# Patient Record
Sex: FEMALE | Race: BLACK OR AFRICAN AMERICAN | Employment: FULL TIME | URBAN - METROPOLITAN AREA
[De-identification: names, ages, dates, MRNs, and addresses within clinical notes are randomized per-mention and may not be internally consistent; named-entity substitution may affect disease eponyms.]

---

## 2017-04-14 ENCOUNTER — HOSPITAL ENCOUNTER (OUTPATIENT)
Dept: MAMMOGRAPHY | Age: 57
Discharge: HOME | End: 2017-04-14
Payer: COMMERCIAL

## 2017-04-14 DIAGNOSIS — Z12.31 VISIT FOR SCREENING MAMMOGRAM: ICD-10-CM

## 2017-04-14 PROCEDURE — 77063 BREAST TOMOSYNTHESIS BI: CPT

## 2017-04-21 ENCOUNTER — HOSPITAL ENCOUNTER (OUTPATIENT)
Dept: MAMMOGRAPHY | Age: 57
Discharge: HOME | End: 2017-04-21
Payer: COMMERCIAL

## 2017-04-21 ENCOUNTER — HOSPITAL ENCOUNTER (OUTPATIENT)
Dept: ULTRASOUND IMAGING | Age: 57
Discharge: HOME | End: 2017-04-21
Payer: COMMERCIAL

## 2017-04-21 DIAGNOSIS — R92.8 ABNORMAL MAMMOGRAM: ICD-10-CM

## 2017-04-21 DIAGNOSIS — N63.0 LUMP OR MASS IN BREAST: ICD-10-CM

## 2017-04-21 PROCEDURE — 77065 DX MAMMO INCL CAD UNI: CPT

## 2017-04-21 PROCEDURE — 76642 ULTRASOUND BREAST LIMITED: CPT

## 2017-06-14 ENCOUNTER — HOSPITAL ENCOUNTER (EMERGENCY)
Age: 57
Discharge: HOME | End: 2017-06-14
Attending: EMERGENCY MEDICINE
Payer: COMMERCIAL

## 2017-06-14 VITALS
HEART RATE: 77 BPM | BODY MASS INDEX: 37.77 KG/M2 | TEMPERATURE: 97.6 F | OXYGEN SATURATION: 99 % | DIASTOLIC BLOOD PRESSURE: 101 MMHG | SYSTOLIC BLOOD PRESSURE: 155 MMHG | RESPIRATION RATE: 16 BRPM | WEIGHT: 235 LBS | HEIGHT: 66 IN

## 2017-06-14 DIAGNOSIS — M43.6 TORTICOLLIS: Primary | ICD-10-CM

## 2017-06-14 PROCEDURE — 96372 THER/PROPH/DIAG INJ SC/IM: CPT

## 2017-06-14 PROCEDURE — 74011250636 HC RX REV CODE- 250/636: Performed by: EMERGENCY MEDICINE

## 2017-06-14 PROCEDURE — 99283 EMERGENCY DEPT VISIT LOW MDM: CPT

## 2017-06-14 PROCEDURE — 74011250637 HC RX REV CODE- 250/637: Performed by: EMERGENCY MEDICINE

## 2017-06-14 RX ORDER — METHOCARBAMOL 750 MG/1
750 TABLET, FILM COATED ORAL 4 TIMES DAILY
Qty: 20 TAB | Refills: 0 | Status: SHIPPED | OUTPATIENT
Start: 2017-06-14

## 2017-06-14 RX ORDER — MORPHINE SULFATE 4 MG/ML
4 INJECTION, SOLUTION INTRAMUSCULAR; INTRAVENOUS
Status: COMPLETED | OUTPATIENT
Start: 2017-06-14 | End: 2017-06-14

## 2017-06-14 RX ORDER — METHOCARBAMOL 500 MG/1
1000 TABLET, FILM COATED ORAL ONCE
Status: COMPLETED | OUTPATIENT
Start: 2017-06-14 | End: 2017-06-14

## 2017-06-14 RX ORDER — GABAPENTIN 300 MG/1
300 CAPSULE ORAL 3 TIMES DAILY
COMMUNITY

## 2017-06-14 RX ORDER — METHOCARBAMOL 500 MG/1
1000 TABLET, FILM COATED ORAL 4 TIMES DAILY
Status: DISCONTINUED | OUTPATIENT
Start: 2017-06-14 | End: 2017-06-14

## 2017-06-14 RX ADMIN — Medication 4 MG: at 23:11

## 2017-06-14 RX ADMIN — METHOCARBAMOL 1000 MG: 500 TABLET ORAL at 23:09

## 2017-06-14 NOTE — LETTER
NOTIFICATION RETURN TO WORK / SCHOOL 
 
6/14/2017 11:35 PM 
 
Ms. Leora Shaver [address] To Whom It May Concern: 
 
Leora Shaver is currently under the care of [id number] [place] [address] EMERGENCY DEPT. She will return to work/school on: 6/16/17 If there are questions or concerns please have the patient contact our office. Sincerely, [address] Tavarez, DO

## 2017-06-15 NOTE — ED NOTES
Both written and verbal discharge instructions given to pt with verbalized understanding of home care and follow up. Prescription given. Pt has ride home with her spouse.

## 2017-06-15 NOTE — ED PROVIDER NOTES
HPI Comments: 10:39 PM Donavon Alvarado is a 65 y.o. female with a hx of HTN who is presenting to the ED c/o muscle spasms in the right side of the neck that started four days ago. She was taking 800mg of Ibuprofen and BC powder and applying heat for the first three days with some relief of the pain, but the pain worsened this morning, so she took her Flexeril and Tramadol which caused her to fall asleep, but the pain would wake her up. The pt denies trauma chest pain, cough, dysuria, hematuria, tobacco use, illicit drug use, EtOH use, and any further sx. The history is provided by the patient. Past Medical History:   Diagnosis Date    Arthritis     Depression     Hypertension     Sciatica        Past Surgical History:   Procedure Laterality Date    HX GYN      hysterectomy    HX ORTHOPAEDIC      left foot x's 3         Family History:   Problem Relation Age of Onset    Diabetes Mother     Heart Disease Mother     Heart Disease Sister     Heart Disease Sister        Social History     Social History    Marital status:      Spouse name: N/A    Number of children: N/A    Years of education: N/A     Occupational History    Not on file. Social History Main Topics    Smoking status: Current Every Day Smoker     Packs/day: 1.50     Years: 30.00     Types: Cigarettes    Smokeless tobacco: Never Used    Alcohol use No    Drug use: No    Sexual activity: Yes     Partners: Female     Other Topics Concern    Not on file     Social History Narrative         ALLERGIES: Demerol [meperidine]    Review of Systems   Constitutional: Negative for chills and fever. HENT: Negative for congestion and sore throat. Respiratory: Negative for cough and shortness of breath. Cardiovascular: Negative for chest pain and leg swelling. Gastrointestinal: Negative for abdominal pain and nausea. Genitourinary: Negative for dysuria and hematuria.    Musculoskeletal: Positive for muscle spasms and neck pain. Negative for back pain. Skin: Negative for rash and wound. Neurological: Negative for syncope, light-headedness and headaches. Psychiatric/Behavioral: Negative for behavioral problems. The patient is not nervous/anxious. All other systems reviewed and are negative. Vitals:    06/14/17 2233   BP: (!) 155/101   Pulse: 77   Resp: 16   Temp: 97.6 °F (36.4 °C)   SpO2: 99%   Weight: 106.6 kg (235 lb)   Height: 5' 6\" (1.676 m)            Physical Exam   Constitutional: She appears well-developed and well-nourished. Non-toxic appearance. She does not have a sickly appearance. She does not appear ill. No distress. HENT:   Head: Normocephalic and atraumatic. Mouth/Throat: Oropharynx is clear and moist. No oropharyngeal exudate. Eyes: Conjunctivae and EOM are normal. Pupils are equal, round, and reactive to light. No scleral icterus. Neck: Trachea normal. Neck supple. Normal carotid pulses, no hepatojugular reflux and no JVD present. Muscular tenderness present. No spinous process tenderness present. Carotid bruit is not present. No rigidity. Decreased range of motion present. No tracheal deviation, no edema and no erythema present. Kernig's sign noted. No Brudzinski's sign noted. No thyromegaly present. Cardiovascular: Normal rate, regular rhythm, S1 normal, S2 normal, normal heart sounds, intact distal pulses and normal pulses. Exam reveals no gallop, no S3 and no S4. No murmur heard. Pulses:       Radial pulses are 2+ on the right side, and 2+ on the left side. Dorsalis pedis pulses are 2+ on the right side, and 2+ on the left side. Pulmonary/Chest: Effort normal and breath sounds normal. No respiratory distress. She has no decreased breath sounds. She has no wheezes. She has no rhonchi. She has no rales. Abdominal: Soft. Normal appearance and bowel sounds are normal. She exhibits no distension and no mass. There is no hepatosplenomegaly. There is no tenderness.  There is no rigidity, no rebound, no guarding, no CVA tenderness, no tenderness at McBurney's point and negative Henry's sign. Musculoskeletal:   Strength 5/5 throughout with exception of right neck/shoulder secondary to pain    Lymphadenopathy:        Head (right side): No submental, no submandibular, no preauricular and no occipital adenopathy present. Head (left side): No submental, no submandibular, no preauricular and no occipital adenopathy present. She has no cervical adenopathy. Right: No supraclavicular adenopathy present. Left: No supraclavicular adenopathy present. Neurological: She is alert. She has normal strength and normal reflexes. She is not disoriented. No cranial nerve deficit or sensory deficit. Coordination and gait normal. GCS eye subscore is 4. GCS verbal subscore is 5. GCS motor subscore is 6. Grossly intact    Skin: Skin is warm, dry and intact. No rash noted. She is not diaphoretic. Psychiatric: She has a normal mood and affect. Her speech is normal and behavior is normal. Judgment and thought content normal. Cognition and memory are normal.   Nursing note and vitals reviewed. MDM  Number of Diagnoses or Management Options  Torticollis:   Diagnosis management comments: Torticollis     ED Course       Procedures      Vitals:  Patient Vitals for the past 12 hrs:   Temp Pulse Resp BP SpO2   06/14/17 2233 97.6 °F (36.4 °C) 77 16 (!) 155/101 99 %     Pulse ox reviewed and WNL    Progress notes, Consult notes or additional Procedure notes:    I have reassessed the patient. Patient is feeling much better. Patient will be prescribed Robaxin. Patient was discharged in stable condition. Patient is to return to emergency department if any new or worsening condition. 11:32 PM      Disposition:  Diagnosis:   1.  Torticollis        Disposition: Discharge    SCRIBE ATTESTATION STATEMENT  Documented by: Anya Crow scribing for, and in the presence of,Sourav STEPHENS DO Adonay   10:44 PM     Signed by: Michael Carroll, 06/14/17 10:44 PM    PROVIDER ATTESTATION STATEMENT  I personally performed the services described in the documentation, reviewed the documentation, as recorded by the scribe in my presence, and it accurately and completely records my words and actions.   [address] Tavarez DO

## 2017-06-15 NOTE — DISCHARGE INSTRUCTIONS
Torticollis: Care Instructions  Your Care Instructions  Torticollis is a severe tightness of the muscles on one side of the neck. The tight muscles can make the head turn to one side, lean to one side, or be pulled forward or backward. It is also called wryneck. Your doctor asked questions about your health and examined you. You may also have had X-rays or other tests. If your doctor thinks another medical problem is causing your tight neck muscles, you may need more tests. Torticollis usually gets better with home care. Your doctor may have you take medicine to relieve pain or relax your muscles. He or she may suggest exercise and physical therapy to help increase flexibility and relieve stress. Your doctor may also have you wear a special collar, called a cervical collar, for a day or two. The collar may help make your neck more comfortable. Follow-up care is a key part of your treatment and safety. Be sure to make and go to all appointments, and call your doctor if you are having problems. It's also a good idea to know your test results and keep a list of the medicines you take. How can you care for yourself at home? · Be safe with medicines. Read and follow all instructions on the label. ¨ If the doctor gave you a prescription medicine for pain, take it as prescribed. ¨ If you are not taking a prescription pain medicine, ask your doctor if you can take an over-the-counter medicine. · Try using a heating pad on a low or medium setting for 15 to 20 minutes every 2 or 3 hours. Try a warm shower in place of one session with the heating pad. · Try using an ice pack for 10 to 15 minutes every 2 to 3 hours. Put a thin cloth between the ice and your skin. · If your doctor recommends a cervical collar, wear it exactly as directed. When should you call for help? Call your doctor now or seek immediate medical care if:  · You have new or worse numbness in your arms, buttocks, or legs.   · You have new or worse weakness in your arms or legs. · Your neck pain gets worse. · You lose bladder or bowel control. Watch closely for changes in your health, and be sure to contact your doctor if:  · You do not get better as expected. Where can you learn more? Go to http://arun-blaze.info/. Enter N373 in the search box to learn more about \"Torticollis: Care Instructions. \"  Current as of: May 23, 2016  Content Version: 11.2  © 4164-8400 [employer]. Care instructions adapted under license by [employer] (which disclaims liability or warranty for this information). If you have questions about a medical condition or this instruction, always ask your healthcare professional. [address] any warranty or liability for your use of this information.

## 2017-06-15 NOTE — ED TRIAGE NOTES
Patient states prior hx of muscle spasms that resolve in a \"day or two\". She states onset of present upper back muscle spasms on Sunday. State no resolution to pain despite NSAID use, tramadol and heat application. C/o spasms across shoulders and down upper back.

## 2017-09-08 ENCOUNTER — OFFICE VISIT (OUTPATIENT)
Dept: SURGERY | Age: 57
End: 2017-09-08

## 2017-09-08 ENCOUNTER — HOSPITAL ENCOUNTER (OUTPATIENT)
Dept: ULTRASOUND IMAGING | Age: 57
Discharge: HOME | End: 2017-09-08
Attending: PHYSICIAN ASSISTANT
Payer: COMMERCIAL

## 2017-09-08 ENCOUNTER — HOSPITAL ENCOUNTER (OUTPATIENT)
Dept: MAMMOGRAPHY | Age: 57
Discharge: HOME | End: 2017-09-08
Attending: PHYSICIAN ASSISTANT
Payer: COMMERCIAL

## 2017-09-08 VITALS — RESPIRATION RATE: 16 BRPM | DIASTOLIC BLOOD PRESSURE: 89 MMHG | SYSTOLIC BLOOD PRESSURE: 139 MMHG

## 2017-09-08 DIAGNOSIS — R92.8 ABNORMAL MAMMOGRAM, UNSPECIFIED: ICD-10-CM

## 2017-09-08 DIAGNOSIS — R22.31 AXILLARY MASS, RIGHT: Primary | ICD-10-CM

## 2017-09-08 PROBLEM — R22.30 AXILLARY MASS: Status: ACTIVE | Noted: 2017-09-08

## 2017-09-08 PROCEDURE — 76642 ULTRASOUND BREAST LIMITED: CPT

## 2017-09-08 PROCEDURE — 77065 DX MAMMO INCL CAD UNI: CPT

## 2017-09-08 NOTE — PROGRESS NOTES
Progress Note    Patient: Luke Guardado  MRN: [id number]  SSN: [id number]   YOB: 1960  Age: 65 y.o. Sex: female     Chief Complaint   Patient presents with    Mass     right axilla       HPI    Ms. Corbin Morales is a 52-year-old woman with a 35 year history of what appears to be an accessory breast tissue in her right axilla. She is also had hidradenitis in that axilla as well. She is becoming more symptomatic with this extra breast tissue which is approximately 15 x 10 cm in size. She would like it removed. I will assess her axilla with CT scan to ensure no lymphadenopathy and plan an excision of her tissue. We discussed how to do this. As well as the risks and benefits of the procedure. Past Medical History:   Diagnosis Date    Arthritis     Depression     Hypertension     Sciatica      Past Surgical History:   Procedure Laterality Date    HX GYN      hysterectomy    HX ORTHOPAEDIC      left foot x's 3     Allergies   Allergen Reactions    Demerol [Meperidine] Anxiety     Current Outpatient Prescriptions   Medication Sig Dispense Refill    gabapentin (NEURONTIN) 300 mg capsule Take 300 mg by mouth three (3) times daily.  losartan 50 mg tab 100 mg, hydroCHLOROthiazide 12.5 mg cap 12.5 mg Take  by mouth daily. States using dosage 160/25mg      IBUPROFEN (MOTRIN PO) Take  by mouth.  methocarbamol (ROBAXIN-750) 750 mg tablet Take 1 Tab by mouth four (4) times daily. 20 Tab 0    cyclobenzaprine (FLEXERIL) 10 mg tablet Take  by mouth three (3) times daily as needed for Muscle Spasm(s). Social History     Social History    Marital status:      Spouse name: N/A    Number of children: N/A    Years of education: N/A     Occupational History    Not on file.      Social History Main Topics    Smoking status: Current Every Day Smoker     Packs/day: 1.50     Years: 30.00    Smokeless tobacco: Never Used      Comment: vapor    Alcohol use No    Drug use: No    Sexual activity: Yes     Partners: Female     Other Topics Concern    Not on file     Social History Narrative     Family History   Problem Relation Age of Onset    Diabetes Mother     Heart Disease Mother     Heart Disease Sister     Heart Disease Sister          Review of systems:  Patient denies any reflux, emesis, abdominal pain, change in bowel habits, hematochezia, melena, fever, weight loss, fatigue chills, dermatitis, abnormal moles, change in vision, vertigo, epistaxis, dysphagia, hoarseness, chest pain, palpitations, hypertension, edema, cough, shortness of breath, wheezing, hemoptysis, snoring, hematuria, diabetes, thyroid disease, anemia, bruising, history of blood transfusion, dizziness, headache, or fainting. Physical Examination    Well developed well nourished female in no apparent distress  Visit Vitals    /89    Resp 16    LMP 08/01/2007      Head: normocephalic, atraumatic  Mouth: Clear, no overt lesions, oral mucosa pink and moist  Neck: supple, no masses, no adenopathy or carotid bruits, trachea midline  Resp: clear to auscultation bilaterally, no wheeze, rhonchi or rales, excursions normal and symmetrical  Cardio: Regular rate and rhythm, no murmurs, clicks, gallops or rubs, no edema or varicosities  Abdomen: soft, nontender, nondistended, normoactive bowel sounds, no hernias, no hepatosplenomegaly,   Back: Deferred  Extremeties: warm, well-perfused, no tenderness or swelling, normal gait/station large excess tissue right axilla likely auxiliary breast tissue neuro: sensation and strength grossly intact and symmetrical  Psych: alert and oriented to person, place and time  Breast exam deferred    IMPRESSION  Right axillary axillary breast tissue    PLAN  No orders of the defined types were placed in this encounter.     Assess axilla and excise tissue  Carmen Maria MD

## 2017-09-08 NOTE — MR AVS SNAPSHOT
Visit Information Date & Time Provider Department Dept. Phone Encounter #  
 9/8/2017  9:00 AM Ronal Tapia MD [facility] [phone] Upcoming Health Maintenance Date Due Hepatitis C Screening 1960 Pneumococcal 19-64 Medium Risk (1 of 1 - PPSV23) 3/22/1980 DTaP/Tdap/Td series (1 - Tdap) 3/22/1982 PAP AKA CERVICAL CYTOLOGY 3/22/1982 FOBT Q 1 YEAR AGE 50-75 3/22/2011 INFLUENZA AGE 9 TO ADULT 8/1/2017 BREAST CANCER SCRN MAMMOGRAM 4/21/2019 Allergies as of 9/8/2017  Review Complete On: 9/8/2017 By: Ronal Tapia MD  
  
 Severity Noted Reaction Type Reactions Demerol [Meperidine] High 09/11/2013    Anxiety Current Immunizations  Never Reviewed No immunizations on file. Not reviewed this visit Vitals BP Resp LMP OB Status Smoking Status 139/89 16 08/01/2007 Hysterectomy Current Every Day Smoker Vitals History Preferred Pharmacy Pharmacy Name Phone [facility] [address] [phone] Your Updated Medication List  
  
   
This list is accurate as of: 9/8/17  9:21 AM.  Always use your most recent med list.  
  
  
  
  
 FLEXERIL 10 mg tablet Generic drug:  cyclobenzaprine Take  by mouth three (3) times daily as needed for Muscle Spasm(s). gabapentin 300 mg capsule Commonly known as:  NEURONTIN Take 300 mg by mouth three (3) times daily. losartan 50 mg tab 100 mg, hydroCHLOROthiazide 12.5 mg cap 12.5 mg Take  by mouth daily. States using dosage 160/25mg  
  
 methocarbamol 750 mg tablet Commonly known as:  [id number]-049 Take 1 Tab by mouth four (4) times daily. MOTRIN PO Take  by mouth. To-Do List   
 09/08/2017 1:15 PM  
  Appointment with [facility] RM 2 at [address] [place] [address] ([phone]) OUTSIDE FILMS  - Any outside films related to the study being scheduled should be brought with you on the day of the exam.  If this cannot be done there may be a delay in the reading of the study. MEDICATIONS  - Patient must bring a complete list of all medications currently taking to include prescriptions, over-the-counter meds, herbals, vitamins & any dietary supplements  GENERAL INSTRUCTIONS  - On the day of your exam do not use any bath powder, deodorant or lotions on the armpit area. 09/08/2017 1:45 PM  
  Appointment with RENE VERDUGO  RM 1 at [address] [place] [address] ([phone]) OUTSIDE FILMS  - Any outside films related to the study being scheduled should be brought with you on the day of the exam.  If this cannot be done there may be a delay in the reading of the study. MEDICATIONS  - Patient must bring a complete list of all medications currently taking to include prescriptions, over-the-counter meds, herbals, vitamins & any dietary supplements Introducing [facility]! Makenna Gonzalez introduces [employer] patient portal. Now you can access parts of your medical record, email your doctor's office, and request medication refills online. 1. In your internet browser, go to [email or web address] 2. Click on the First Time User? Click Here link in the Sign In box. You will see the New Member Sign Up page. 3. Enter your [employer] Access Code exactly as it appears below. You will not need to use this code after youve completed the sign-up process. If you do not sign up before the expiration date, you must request a new code. · [employer] Access Code: [email or web address] Expires: 9/30/2017  7:54 PM 
 
4. Enter the last four digits of your Social Security Number (xxxx) and Date of Birth (mm/dd/yyyy) as indicated and click Submit. You will be taken to the next sign-up page. 5. Create a Tagor[employer]et ID.  This will be your [employer] login ID and cannot be changed, so think of one that is secure and easy to remember. 6. Create a [employer] password. You can change your password at any time. 7. Enter your Password Reset Question and Answer. This can be used at a later time if you forget your password. 8. Enter your e-mail address. You will receive e-mail notification when new information is available in [address] 9. Click Sign Up. You can now view and download portions of your medical record. 10. Click the Download Summary menu link to download a portable copy of your medical information. If you have questions, please visit the Frequently Asked Questions section of the [employer] website. Remember, [employer] is NOT to be used for urgent needs. For medical emergencies, dial 911. Now available from your iPhone and Android! Please provide this summary of care documentation to your next provider. Your primary care clinician is listed as Maria Eugenia Hernandez. If you have any questions after today's visit, please call [phone].

## 2018-01-24 ENCOUNTER — HOSPITAL ENCOUNTER (OUTPATIENT)
Dept: GENERAL RADIOLOGY | Age: 58
Discharge: HOME | End: 2018-01-24
Payer: COMMERCIAL

## 2018-01-24 DIAGNOSIS — M79.631 RIGHT FOREARM PAIN: ICD-10-CM

## 2018-01-24 DIAGNOSIS — M79.644 THUMB PAIN, RIGHT: ICD-10-CM

## 2018-01-24 PROCEDURE — 73130 X-RAY EXAM OF HAND: CPT

## 2018-01-24 PROCEDURE — 73090 X-RAY EXAM OF FOREARM: CPT

## 2018-01-30 ENCOUNTER — OFFICE VISIT (OUTPATIENT)
Dept: ORTHOPEDIC SURGERY | Age: 58
End: 2018-01-30

## 2018-01-30 VITALS
BODY MASS INDEX: 37.77 KG/M2 | SYSTOLIC BLOOD PRESSURE: 147 MMHG | TEMPERATURE: 98.8 F | HEIGHT: 66 IN | RESPIRATION RATE: 16 BRPM | WEIGHT: 235 LBS | HEART RATE: 99 BPM | OXYGEN SATURATION: 100 % | DIASTOLIC BLOOD PRESSURE: 86 MMHG

## 2018-01-30 DIAGNOSIS — M65.4 DE QUERVAIN'S TENOSYNOVITIS, RIGHT: Primary | ICD-10-CM

## 2018-01-30 RX ORDER — GLIPIZIDE 5 MG/1
TABLET ORAL 2 TIMES DAILY
COMMUNITY

## 2018-01-30 RX ORDER — METFORMIN HYDROCHLORIDE 1000 MG/1
1000 TABLET ORAL 2 TIMES DAILY WITH MEALS
COMMUNITY

## 2018-01-30 RX ORDER — TRIAMCINOLONE ACETONIDE 40 MG/ML
20 INJECTION, SUSPENSION INTRA-ARTICULAR; INTRAMUSCULAR ONCE
Qty: 1 ML | Refills: 0
Start: 2018-01-30 | End: 2018-01-30

## 2018-01-30 NOTE — PROGRESS NOTES
Eliot Crowe  1960   Chief Complaint   Patient presents with    Arm Pain     Right    Finger Pain     Right thumb        HISTORY OF PRESENT ILLNESS  Eliot Crowe is a 65 y.o. female who presents today for evaluation of right arm and right thumb pain. Patient was referred by Iza Taylor. she rates her pain 4/10 today. Pain has been present for a few months. Patient describes the pain as aching, stabbing and throbbing that is Intermittent in nature. Symptoms are worse with typing, gripping, certain movements of the hand/wrist, Activity, Work and is better with  Rest. Associated symptoms include soreness, stiffness. Since problem started, it: has worsened. Pain does wake patient up at night. Has taken no recent medications for the problem. Has tried following treatments: Injections:NO; Brace:NO; Therapy:NO; Cane/Crutch:NO       Allergies   Allergen Reactions    Demerol [Meperidine] Anxiety        Past Medical History:   Diagnosis Date    Arthritis     Depression     Diabetes ([facility] [address]     Hypertension     Sciatica       Social History     Social History    Marital status:      Spouse name: N/A    Number of children: N/A    Years of education: N/A     Occupational History    Not on file.      Social History Main Topics    Smoking status: Current Every Day Smoker     Years: 30.00    Smokeless tobacco: Never Used      Comment: vapor    Alcohol use No    Drug use: No    Sexual activity: Yes     Partners: Female     Other Topics Concern    Not on file     Social History Narrative      Past Surgical History:   Procedure Laterality Date    FOOT/TOES SURGERY PROC UNLISTED      HX GYN      hysterectomy    HX HYSTERECTOMY      HX ORTHOPAEDIC      left foot x's 3      Family History   Problem Relation Age of Onset    Diabetes Mother     Heart Disease Mother     Heart Disease Sister     Heart Disease Sister       Current Outpatient Prescriptions   Medication Sig    metFORMIN (GLUCOPHAGE) 1,000 mg tablet Take 1,000 mg by mouth two (2) times daily (with meals).  glipiZIDE (GLUCOTROL) 5 mg tablet Take  by mouth two (2) times a day.  triamcinolone acetonide (KENALOG) 40 mg/mL injection 0.5 mL by IntraMUSCular route once for 1 dose.  gabapentin (NEURONTIN) 300 mg capsule Take 300 mg by mouth three (3) times daily.  losartan 50 mg tab 100 mg, hydroCHLOROthiazide 12.5 mg cap 12.5 mg Take  by mouth daily. States using dosage 160/25mg    IBUPROFEN (MOTRIN PO) Take  by mouth.  methocarbamol (ROBAXIN-750) 750 mg tablet Take 1 Tab by mouth four (4) times daily.  cyclobenzaprine (FLEXERIL) 10 mg tablet Take  by mouth three (3) times daily as needed for Muscle Spasm(s). No current facility-administered medications for this visit. REVIEW OF SYSTEM   Patient denies: Weight loss, Fever/Chills, HA, Visual changes, Fatigue, Chest pain, SOB, Abdominal pain, N/V/D/C, Blood in stool or urine, Edema. Pertinent positive as above in HPI. All others were negative    PHYSICAL EXAM:   Visit Vitals    /86    Pulse 99    Temp 98.8 °F (37.1 °C) (Oral)    Resp 16    Ht 5' 6\" (1.676 m)    Wt 235 lb (106.6 kg)    LMP 08/01/2007    SpO2 100%    BMI 37.93 kg/m2     The patient is a well-developed, well-nourished female   in no acute distress. The patient is alert and oriented times three. The patient is alert and oriented times three. Mood and affect are normal.  LYMPHATIC: lymph nodes are not enlarged and are within normal limits  SKIN: normal in color and non tender to palpation. There are no bruises or abrasions noted. NEUROLOGICAL: Motor sensory exam is within normal limits. Reflexes are equal bilaterally.  There is normal sensation to pinprick and light touch  MUSCULOSKELETAL:  Examination Right Hand   Skin Intact   Deformity -   Swelling -   Tenderness -   Tenderness A1 Pulley -   Finger flexion Full   Finger extension Full   Thenar Eminence Atrophy -   Sensation Normal   Capillary refill -   Heberden's nodes -   Dupuytren's -     Examination Right Wrist   Skin Intact   Tenderness First dorsal compartment   Flexion 60   Extension 60   Deformity -   Effusion -   Tinnel's sign -   Phalen's test -   Finklestein maneuver +   Pain with thumb abduction -       PROCEDURE: After sterile prep, 0.5 cc of Kenalog were injected into the right 1st dorsal compartment. [facility]  OFFICE PROCEDURE PROGRESS NOTE        Chart reviewed for the following:  Luisana Hooper MD, have reviewed the History, Physical and updated the Allergic reactions for [address] performed immediately prior to start of procedure:  Luisana Hooper MD, have performed the following reviews on [address] prior to the start of the procedure:            * Patient was identified by name and date of birth   * Agreement on procedure being performed was verified  * Risks and Benefits explained to the patient  * Procedure site verified and marked as necessary  * Patient was positioned for comfort  * Consent was signed and verified     Time: 2:12 PM    Date of procedure: 1/30/2018    Procedure performed by:  Noah Sampson MD    Provider assisted by: (see medication administration)    How tolerated by patient: tolerated the procedure well with no complications    Comments: none      IMAGING: XR of the right forearm dated 1/24/18 was reviewed and read:   IMPRESSION:  No acute osseous abnormality. Minimal degenerative change. XR of the right hand dated 1/24/18 was reviewed and read: Impression:  No acute osseous abnormality. Mild degenerative change at the first MCP joint. IMPRESSION:      ICD-10-CM ICD-9-CM    1. De Quervain's tenosynovitis, right M65.4 727.04 TRIAMCINOLONE ACETONIDE INJ      triamcinolone acetonide (KENALOG) 40 mg/mL injection      NC DRAIN/INJECT SMALL JOINT/BURSA        PLAN:  1.  Patient's right hand/wrist pain is coming from Yoseph Jenkins. She feels her pain is bad enough for an injection today. Risk factors include: dm, htn  2. Yes cortisone injection indicated today R 1ST DORSAL COMPARTMENT  3. No Physical/Occupational Therapy indicated today  4. No diagnostic test indicated today  5. No durable medical equipment indicated today  6. No referral indicated today   7. No medications indicated today  8. No Narcotic indicated today     RTC 4 weeks  Follow-up Disposition: Not on File  Office note will be sent to referring provider. Scribed by Lamar Sanford ([address] [place] [address] [address]) as dictated by Kristin Aviles MD    I, Dr. Kristin Aviles, confirm that all documentation is accurate.     Kristin Aviles M.D.   [facility]

## 2019-02-19 ENCOUNTER — OFFICE VISIT (OUTPATIENT)
Dept: ORTHOPEDIC SURGERY | Age: 59
End: 2019-02-19

## 2019-02-19 VITALS
BODY MASS INDEX: 37.77 KG/M2 | OXYGEN SATURATION: 96 % | HEIGHT: 66 IN | TEMPERATURE: 97.9 F | SYSTOLIC BLOOD PRESSURE: 151 MMHG | DIASTOLIC BLOOD PRESSURE: 86 MMHG | WEIGHT: 235 LBS | RESPIRATION RATE: 11 BRPM | HEART RATE: 97 BPM

## 2019-02-19 DIAGNOSIS — M65.4 DE QUERVAIN'S TENOSYNOVITIS, RIGHT: Primary | ICD-10-CM

## 2019-02-19 RX ORDER — AMLODIPINE BESYLATE 5 MG/1
5 TABLET ORAL DAILY
COMMUNITY

## 2019-02-19 RX ORDER — DEXTROMETHORPHAN HYDROBROMIDE, GUAIFENESIN 5; 100 MG/5ML; MG/5ML
650 LIQUID ORAL EVERY 8 HOURS
COMMUNITY

## 2019-02-19 RX ORDER — ATORVASTATIN CALCIUM 10 MG/1
TABLET, FILM COATED ORAL DAILY
COMMUNITY

## 2019-02-19 RX ORDER — TRIAMCINOLONE ACETONIDE 40 MG/ML
40 INJECTION, SUSPENSION INTRA-ARTICULAR; INTRAMUSCULAR ONCE
Qty: 1 ML | Refills: 0
Start: 2019-02-19 | End: 2019-02-19

## 2019-02-19 NOTE — PROGRESS NOTES
Obdulio Dockery 1960 Chief Complaint Patient presents with  Wrist Pain RIGHT WRIST PAIN  
  
 
HISTORY OF PRESENT ILLNESS Obdulio Dockery is a 63 y.o. female who presents today for reevaluation of right wrist pain. Patient rates pain as 8/10 today. At last OV, patient had a cortisone injection which provided some relief, but the pain has returned. Pain with certain movements of the wrist, palpation. Pain with gripping and lifting objects. She also complains of weakness and pain in her left wrist and sometimes drops things. Patient denies any fever, chills, chest pain, shortness of breath or calf pain. The remainder of the review of systems is negative. There are no new illness or injuries to report since last seen in the office. There are no changes to medications, allergies, family or social history. Pain Assessment  2/19/2019 Location of Pain Wrist  
Location Modifiers Right Severity of Pain 8 Quality of Pain Aching Duration of Pain Persistent Frequency of Pain Constant Aggravating Factors Bending; Other (Comment) Aggravating Factors Comment LIFTING Limiting Behavior -  
Relieving Factors Rest  
Result of Injury No  
 
PHYSICAL EXAM:  
Visit Vitals /86 Pulse 97 Temp 97.9 °F (36.6 °C) (Oral) Resp 11 Ht 5' 6\" (1.676 m) Wt 235 lb (106.6 kg) LMP 08/01/2007 SpO2 96% BMI 37.93 kg/m² The patient is a well-developed, well-nourished female   in no acute distress. The patient is alert and oriented times three. The patient is alert and oriented times three. Mood and affect are normal. 
LYMPHATIC: lymph nodes are not enlarged and are within normal limits SKIN: normal in color and non tender to palpation. There are no bruises or abrasions noted. NEUROLOGICAL: Motor sensory exam is within normal limits. Reflexes are equal bilaterally. There is normal sensation to pinprick and light touch MUSCULOSKELETAL: 
Examination Right Hand Skin Intact Deformity - Swelling - Tenderness - Tenderness A1 Pulley - Finger flexion Full Finger extension Full Thenar Eminence Atrophy - Sensation Normal  
Capillary refill -  
Heberden's nodes -  
Dupuytren's -  
  
Examination Right Wrist  
Skin Intact Tenderness First dorsal compartment Flexion 60 Extension 60 Deformity -  
Effusion - Tinnel's sign - Phalen's test - Finklestein maneuver + Pain with thumb abduction -  
  
 
PROCEDURE: Right 1st CMC joint Injection with Ultrasound Guidance Indication:Right 1st CMC joint pain/swelling After sterile prep, 0.5 cc of Xylocaine and 0.5 cc of Kenalog were injected into the right 1st CMC joint. Ultrasound images captured using [address] [place] [address] [employer] Ultrasound machine and scanned into patient's chart. [facility] PROCEDURE PROGRESS NOTE Chart reviewed for the following: 
Caitlyn Holman M.D, have reviewed the History, Physical and updated the Allergic reactions for Kassidy CRAWFORD OUT performed immediately prior to start of procedure: 
I, Anita Yang M.D, have performed the following reviews on Kassidy Hopkins prior to the start of the procedure: 
         
* Patient was identified by name and date of birth * Agreement on procedure being performed was verified * Risks and Benefits explained to the patient * Procedure site verified and marked as necessary * Patient was positioned for comfort * Consent was signed and verified Time: 1:18 PM  
 
Date of procedure: 2/19/2019 Procedure performed by:  Anita Yang M.D Provider assisted by: (see medication administration) How tolerated by patient: tolerated the procedure well with no complications Comments: none IMAGING: XR of the right forearm dated 1/24/18 was reviewed and read:  
IMPRESSION: 
No acute osseous abnormality.  
Minimal degenerative change. 
  
 XR of the right hand dated 1/24/18 was reviewed and read: Impression: No acute osseous abnormality. Mild degenerative change at the first MCP joint. IMPRESSION:   
  ICD-10-CM ICD-9-CM 1. De Quervain's tenosynovitis, right M65.4 727.04 TRIAMCINOLONE ACETONIDE INJ  
   triamcinolone acetonide (KENALOG) 40 mg/mL injection US GUIDE INJ/ASP/ARTHRO SM JNT/BURSA PLAN:  
1. The patient presents today with right wrist pain due to de quervain's tenosynovitis and I would like to try an injection today. Risk factors include: dm, htn, no NSAIDs 2. No ultrasound exam indicated today 3. Yes cortisone injection indicated today R 1ST [address] JOINT US 
4. No Physical/Occupational Therapy indicated today 5. No diagnostic test indicated today: 6. No durable medical equipment indicated today 7. Yes referral indicated today Dr. Lynsey Jacob 8. No medications indicated today: 9. No Narcotic indicated today RTC prn Follow-up Disposition: Not on File Scribed by Brant Garza ([facility]) as dictated by Kamila Charles MD 
 
I, Dr. Kamila Charles, confirm that all documentation is accurate.  
 
Kamila Charles M.D.  
[facility]

## 2019-02-19 NOTE — PROGRESS NOTES
1. Have you been to the ER, urgent care clinic since your last visit? Hospitalized since your last visit? No 
 
2. Have you seen or consulted any other health care providers outside of the [address] [place] [address] since your last visit? Include any pap smears or colon screening.  No

## 2019-05-25 ENCOUNTER — HOSPITAL ENCOUNTER (OUTPATIENT)
Dept: LAB | Age: 59
Discharge: HOME | End: 2019-05-25

## 2019-05-25 LAB — XX-LABCORP SPECIMEN COL,LCBCF: NORMAL

## 2019-05-25 PROCEDURE — 99001 SPECIMEN HANDLING PT-LAB: CPT

## 2019-06-14 ENCOUNTER — HOSPITAL ENCOUNTER (OUTPATIENT)
Dept: MAMMOGRAPHY | Age: 59
Discharge: HOME | End: 2019-06-14
Attending: NURSE PRACTITIONER
Payer: COMMERCIAL

## 2019-06-14 ENCOUNTER — HOSPITAL ENCOUNTER (OUTPATIENT)
Dept: BONE DENSITY | Age: 59
Discharge: HOME | End: 2019-06-14
Attending: NURSE PRACTITIONER
Payer: COMMERCIAL

## 2019-06-14 DIAGNOSIS — Z12.31 VISIT FOR SCREENING MAMMOGRAM: ICD-10-CM

## 2019-06-14 DIAGNOSIS — M89.9 DISORDER OF BONE AND CARTILAGE: ICD-10-CM

## 2019-06-14 DIAGNOSIS — M94.9 DISORDER OF BONE AND CARTILAGE: ICD-10-CM

## 2019-06-14 PROCEDURE — 77067 SCR MAMMO BI INCL CAD: CPT

## 2019-06-14 PROCEDURE — 77080 DXA BONE DENSITY AXIAL: CPT

## 2019-11-08 ENCOUNTER — HOSPITAL ENCOUNTER (OUTPATIENT)
Dept: LAB | Age: 59
Discharge: HOME | End: 2019-11-08

## 2019-11-08 ENCOUNTER — HOSPITAL ENCOUNTER (OUTPATIENT)
Dept: GENERAL RADIOLOGY | Age: 59
Discharge: HOME | End: 2019-11-08
Payer: COMMERCIAL

## 2019-11-08 DIAGNOSIS — M54.50 LUMBAR BACK PAIN: ICD-10-CM

## 2019-11-08 LAB — XX-LABCORP SPECIMEN COL,LCBCF: NORMAL

## 2019-11-08 PROCEDURE — 72110 X-RAY EXAM L-2 SPINE 4/>VWS: CPT

## 2019-11-08 PROCEDURE — 99001 SPECIMEN HANDLING PT-LAB: CPT

## 2020-07-24 ENCOUNTER — OFFICE VISIT (OUTPATIENT)
Dept: ORTHOPEDIC SURGERY | Age: 60
End: 2020-07-24

## 2020-07-24 VITALS
HEIGHT: 66 IN | WEIGHT: 236.6 LBS | HEART RATE: 107 BPM | DIASTOLIC BLOOD PRESSURE: 80 MMHG | OXYGEN SATURATION: 97 % | SYSTOLIC BLOOD PRESSURE: 142 MMHG | BODY MASS INDEX: 38.02 KG/M2 | TEMPERATURE: 99.1 F

## 2020-07-24 DIAGNOSIS — M47.817 LUMBOSACRAL SPONDYLOSIS WITHOUT MYELOPATHY: Primary | ICD-10-CM

## 2020-07-24 DIAGNOSIS — M54.16 LUMBAR NEURITIS: ICD-10-CM

## 2020-07-24 DIAGNOSIS — M51.36 DDD (DEGENERATIVE DISC DISEASE), LUMBAR: ICD-10-CM

## 2020-07-24 RX ORDER — PREGABALIN 75 MG/1
75 CAPSULE ORAL 2 TIMES DAILY
Qty: 60 CAP | Refills: 1 | Status: SHIPPED | OUTPATIENT
Start: 2020-07-24

## 2020-07-24 RX ORDER — BUPROPION HYDROCHLORIDE 150 MG/1
TABLET, EXTENDED RELEASE ORAL
COMMUNITY

## 2020-07-24 NOTE — PROGRESS NOTES
[facility]  [address], [place]  Phone: [phone]  Fax: [phone]        INITIAL CONSULTATION      HISTORY OF PRESENT ILLNESS:  Kristy Cadnelario is a 62 y.o. female whom is referred from [facility], [address] secondary to progressive low back pain radiating into the BLE (RLE>LLE) anterolaterally to the knee x 5 years without trauma. She rates her pain 5-10/10. Her pain is exacerbated by prolonged positions. Her pain disrupts her sleep. Pt was previously on Neurontin 300 mg TID for back pain. She recalls tolerating the medication with benefit. She could not tolerate a higher dose of Neurontin. Pt was previously on Lyrica 75 mg BID. She recalls tolerating the medication. She was previously seen by [address] [place] [address] for the same pain complaints. Pt had previous PT and chiropractic care. She is not on a HEP. Patient denies previous spinal surgery or injections. Pt denies change in bowel or bladder habits. Pt denies fever, weight loss, or skin changes. Pt is a smoker. PmHx of obesity, DM, depression. The patient has a history of DM and reports blood sugars are well controlled, consistently remaining below 200. Her DM is followed by her PCP, Rashida Cornejo MD. Note from [facility], [address] dated 6/24/2020 indicating patient was seen with c/o low back pain with right sided sciatica. Treated with Lyrica. L spine XR dated 11/8/2019 films not independently reviewed. Per report, no acute findings. I independently reviewed the films and noted: left iliac crest seems to be slightly higher level than the right.  reviewed. Body mass index is 38.19 kg/m².     PCP: Rashida Cornejo MD    Past Medical History:   Diagnosis Date    Arthritis     Depression     Diabetes ([facility] [address]     Hypertension     Sciatica    HX H  Past Surgical History:   Procedure Laterality Date    FOOT/TOES SURGERY PROC UNLISTED      HX GYN      hysterectomy    HX HYSTERECTOMY  HX ORTHOPAEDIC      left foot x's 3   YSTERECTOMY      HX ORTHOPAEDIC      left foot x's 3        Tobacco Use    Smoking status: Current Every Day Smoker     Years: 30.00    Smokeless tobacco: Never Used    Tobacco comment: vapor   Substance Use Topics    Alcohol use: No       Work status: The patient is employed. Marital status: . Current Outpatient Medications   Medication Sig Dispense Refill    buPROPion SR (Wellbutrin SR) 150 mg SR tablet 1 tablet in the morning      SITagliptin-metFORMIN (JANUMET) 50-1,000 mg per tablet Take 1 Tab by mouth two (2) times daily (with meals).  amLODIPine (NORVASC) 5 mg tablet Take 5 mg by mouth daily.  atorvastatin (LIPITOR) 10 mg tablet Take  by mouth daily.  acetaminophen (TYLENOL ARTHRITIS PAIN) 650 mg TbER Take 650 mg by mouth every eight (8) hours.  metFORMIN (GLUCOPHAGE) 1,000 mg tablet Take 1,000 mg by mouth two (2) times daily (with meals).  glipiZIDE (GLUCOTROL) 5 mg tablet Take  by mouth two (2) times a day.  gabapentin (NEURONTIN) 300 mg capsule Take 300 mg by mouth three (3) times daily.  losartan 50 mg tab 100 mg, hydroCHLOROthiazide 12.5 mg cap 12.5 mg Take  by mouth daily. States using dosage 160/25mg      methocarbamol (ROBAXIN-750) 750 mg tablet Take 1 Tab by mouth four (4) times daily. 20 Tab 0    cyclobenzaprine (FLEXERIL) 10 mg tablet Take  by mouth three (3) times daily as needed for Muscle Spasm(s).  IBUPROFEN (MOTRIN PO) Take  by mouth.          Allergies   Allergen Reactions    Demerol [Meperidine] Anxiety            Family History   Problem Relation Age of Onset    Diabetes Mother     Heart Disease Mother     Heart Disease Sister     Heart Disease Sister          REVIEW OF SYSTEMS  Constitutional symptoms: Negative  Eyes: Negative  Ears, Nose, Throat, and Mouth: Negative  Cardiovascular: Negative  Respiratory: Negative  Genitourinary: Negative  Integumentary (Skin and/or breast): Negative  Musculoskeletal: Positive for low back pain radiating into the BLE (RLE>LLE) anterolaterally to the knee. Extremities: Negative for edema. Endocrine/Rheumatologic: Negative  Hematologic/Lymphatic: Negative  Allergic/Immunologic: Negative  Psychiatric: Negative       PHYSICAL EXAMINATION  Visit Vitals  /80 (BP 1 Location: Left arm, BP Patient Position: Sitting)   Pulse (!) 107   Temp 99.1 °F (37.3 °C) (Temporal)   Ht 5' 6\" (1.676 m)   Wt 236 lb 9.6 oz (107.3 kg)   LMP 08/01/2007   SpO2 97%   BMI 38.19 kg/m²       CONSTITUTIONAL: NAD, A&O x 3  HEART: Regular rate and rhythm  GASTROINTESTINAL: Positive bowel sounds, soft, nontender, and nondistended  LUNGS: Clear to auscultation bilaterally. SKIN: Negative for rash. RANGE OF MOTION: The patient has full passive range of motion in all four extremities. SENSATION: Sensation is intact to light touch throughout. MOTOR:   Straight Leg Raise: Negative, bilateral  [place]que: Negative, bilateral  Deep tendon reflexes are 1 at the biceps and triceps bilaterally and 0 on the RUE and 1 on the LUE at the brachioradialis. Deep tendon reflexes are trace on the RLE and 2 on the LLE at the knees and 0 at the ankles bilaterally. Shoulder AB/Flex Elbow Flex Wrist Ext Elbow Ext Wrist Flex Hand Intrin Tone   Right +4/5 +4/5 +4/5 +4/5 +4/5 +4/5 +4/5   Left +4/5 +4/5 +4/5 +4/5 +4/5 +4/5 +4/5              Hip Flex Knee Ext Knee Flex Ankle DF GTE Ankle PF Tone   Right +4/5 +4/5 +4/5 +4/5 +4/5 +4/5 +4/5   Left +4/5 +4/5 +4/5 +4/5 +4/5 +4/5 +4/5       ASSESSMENT   Diagnoses and all orders for this visit:    1. Lumbosacral spondylosis without myelopathy  -     REFERRAL TO PHYSICAL THERAPY  -     pregabalin (Lyrica) 75 mg capsule; Take 1 Cap by mouth two (2) times a day. Max Daily Amount: 150 mg.    2. Lumbar neuritis  -     REFERRAL TO PHYSICAL THERAPY  -     pregabalin (Lyrica) 75 mg capsule; Take 1 Cap by mouth two (2) times a day.  Max Daily Amount: 150 mg.    3. DDD (degenerative disc disease), lumbar  -     REFERRAL TO PHYSICAL THERAPY  -     pregabalin (Lyrica) 75 mg capsule; Take 1 Cap by mouth two (2) times a day. Max Daily Amount: 150 mg.        IMPRESSIONS/RECOMMENDATIONS:  Patient presents today with c/o low back pain radiating into the BLE (RLE>LLE) anterolaterally to the knee. Multiple treatment options were discussed. I will refer her to physical therapy with an emphasis on HEP. I will restart her Lyrica 75 mg BID. Patient advised to call the office if intolerant to medication. I will have the patient sign a release of medical information to obtain records from [address] [place] [address]. Patient is neurologically intact. I will see the patient back in 1 month's time or earlier if needed. Written by Maribel Anderson, as dictated by Sasha Nunez MD  I examined the patient, reviewed and agree with the note.

## 2020-07-24 NOTE — LETTER
7/24/20 Patient: Sherine Morris YOB: 1960 Date of Visit: 7/24/2020 [address] Dimitrios Edward MD 
[address] [place] [address] [address] [facility] [place] [address] VIA Facsimile: [phone] Dear 241 Dimitrios Edward MD, Thank you for referring Ms. Shilpa Wolf to [address] for evaluation. My notes for this consultation are attached. If you have questions, please do not hesitate to call me. I look forward to following your patient along with you. Sincerely, Chico Mclaughlin MD

## 2020-07-31 ENCOUNTER — HOSPITAL ENCOUNTER (OUTPATIENT)
Dept: MAMMOGRAPHY | Age: 60
Discharge: HOME | End: 2020-07-31
Attending: PHYSICIAN ASSISTANT
Payer: COMMERCIAL

## 2020-07-31 DIAGNOSIS — Z12.31 VISIT FOR SCREENING MAMMOGRAM: ICD-10-CM

## 2020-07-31 PROCEDURE — 77067 SCR MAMMO BI INCL CAD: CPT

## 2020-08-14 ENCOUNTER — TELEPHONE (OUTPATIENT)
Dept: ORTHOPEDIC SURGERY | Age: 60
End: 2020-08-14

## 2020-08-14 RX ORDER — CYCLOBENZAPRINE HCL 5 MG
5 TABLET ORAL
Qty: 30 TAB | Refills: 0 | Status: SHIPPED | OUTPATIENT
Start: 2020-08-14

## 2020-08-14 NOTE — TELEPHONE ENCOUNTER
Patient called:    reports new symptom with muscle spasms from neck down to shoulder blade area. Patient last seen 7/24/2020 and has a f/u 8/21/2020 (next Friday)    Patient is requesting Flexeril for relief.     Pharmacy: [id number]    Pt W#[phone]

## 2020-08-20 NOTE — PROGRESS NOTES
[facility]  [address], [place]   Phone: [phone]  Fax: [phone]        PROGRESS NOTE      HISTORY OF PRESENT ILLNESS:  The patient is a 62 y.o. female and was seen today for follow up of progressive low back pain radiating into the BLE (RLE>LLE) anterolaterally to the knee x 5 years without trauma. Her pain is exacerbated by prolonged positions. Her pain disrupts her sleep. Pt was previously on Neurontin 300 mg TID for back pain. She recalls tolerating the medication with benefit. She could not tolerate a higher dose of NEURONTIN. Pt was previously on Lyrica 75 mg BID. She recalls tolerating the medication. Pt reports intolerance to PREDNISONE, secondary to drowsiness. She was previously seen by [address] [place] [address] for the same pain complaints. Pt had previous PT and chiropractic care. She is not on a HEP. Patient denies previous spinal surgery or injections. Pt denies change in bowel or bladder habits. Pt denies fever, weight loss, or skin changes. Pt is a smoker. PmHx of obesity, DM, depression. The patient has a history of DM and reports blood sugars are well controlled, consistently remaining below 200. Her DM is followed by her PCP, Geoff Barboza MD. Note from [place], [place] dated 6/24/2020 indicating patient was seen with c/o low back pain with right sided sciatica. Treated with Lyrica. L spine XR dated 11/8/2019 films not independently reviewed. Per report, no acute findings. I independently reviewed the films and noted: left iliac crest seems to be slightly higher level than the right. At her last clinical appointment, I referred her to physical therapy with an emphasis on HEP. I restarted her Lyrica 75 mg BID. I had the patient sign a release of medical information to obtain records from [address] [place] [address]. The patient returns today and reports pain location and distribution remains unchanged.  She rates her pain 4-8/10, previously 5-10/10. She reports her pain is less intense in nature. She is tolerating the Lyrica 75 mg BID with some benefit. Pt reports she had her PT evaluation today. Pt denies change in bowel or bladder habits. The patient has a history of DM and reports blood sugars are well controlled, consistently remaining below 200. Note from Dr. Rudy Hermosillo dated 1/24/2020 indicating patient was seen with c/o pain in the lower lumbar region involving the BLE (R>L) to the knees. Her pain was 5/10. Switched her from Neurontin 300 mg TID to Lyrica. Note from Brannon Umana NP dated 1/30/2020 indicating patient was seen with c/o right sided de quervain tenosynovitis.  reviewed. Body mass index is 38.29 kg/m².     PCP: Onel Figueredo MD      Past Medical History:   Diagnosis Date    Arthritis     Depression     Diabetes ([facility] [address]     Hypertension     Menopause     Sciatica         Social History     Socioeconomic History    Marital status:      Spouse name: Not on file    Number of children: Not on file    Years of education: Not on file    Highest education level: Not on file   Occupational History    Not on file   Social Needs    Financial resource strain: Not on file    Food insecurity     Worry: Not on file     Inability: Not on file    Transportation needs     Medical: Not on file     Non-medical: Not on file   Tobacco Use    Smoking status: Current Every Day Smoker     Years: 30.00    Smokeless tobacco: Never Used    Tobacco comment: vapor   Substance and Sexual Activity    Alcohol use: No    Drug use: No    Sexual activity: Yes     Partners: Female   Lifestyle    Physical activity     Days per week: Not on file     Minutes per session: Not on file    Stress: Not on file   Relationships    Social connections     Talks on phone: Not on file     Gets together: Not on file     Attends Jain service: Not on file     Active member of club or organization: Not on file Attends meetings of clubs or organizations: Not on file     Relationship status: Not on file    Intimate partner violence     Fear of current or ex partner: Not on file     Emotionally abused: Not on file     Physically abused: Not on file     Forced sexual activity: Not on file   Other Topics Concern    Not on file   Social History Narrative    Not on file       Current Outpatient Medications   Medication Sig Dispense Refill    cyclobenzaprine (FLEXERIL) 5 mg tablet Take 1 Tab by mouth two (2) times daily as needed for Muscle Spasm(s). 30 Tab 0    buPROPion SR (Wellbutrin SR) 150 mg SR tablet 1 tablet in the morning      pregabalin (Lyrica) 75 mg capsule Take 1 Cap by mouth two (2) times a day. Max Daily Amount: 150 mg. 60 Cap 1    SITagliptin-metFORMIN (JANUMET) 50-1,000 mg per tablet Take 1 Tab by mouth two (2) times daily (with meals).  amLODIPine (NORVASC) 5 mg tablet Take 5 mg by mouth daily.  atorvastatin (LIPITOR) 10 mg tablet Take  by mouth daily.  methocarbamol (ROBAXIN-750) 750 mg tablet Take 1 Tab by mouth four (4) times daily. 20 Tab 0    acetaminophen (TYLENOL ARTHRITIS PAIN) 650 mg TbER Take 650 mg by mouth every eight (8) hours.  metFORMIN (GLUCOPHAGE) 1,000 mg tablet Take 1,000 mg by mouth two (2) times daily (with meals).  glipiZIDE (GLUCOTROL) 5 mg tablet Take  by mouth two (2) times a day.  gabapentin (NEURONTIN) 300 mg capsule Take 300 mg by mouth three (3) times daily.  losartan 50 mg tab 100 mg, hydroCHLOROthiazide 12.5 mg cap 12.5 mg Take  by mouth daily. States using dosage 160/25mg      IBUPROFEN (MOTRIN PO) Take  by mouth.          Allergies   Allergen Reactions    Demerol [Meperidine] Anxiety          PHYSICAL EXAMINATION    Visit Vitals  /79 (BP 1 Location: Left arm, BP Patient Position: Sitting)   Pulse 99   Temp 97.4 °F (36.3 °C) (Temporal)   Wt 237 lb 3.2 oz (107.6 kg)   LMP 08/01/2007   SpO2 98%   BMI 38.29 kg/m²       CONSTITUTIONAL: NAD, A&O x 3  SENSATION: Decreased sensation to light touch on the RLE in a L4 and L5 distribution. Otherwise, intact to light touch throughout  RANGE OF MOTION: The patient has full passive range of motion in all four extremities. MOTOR:  Straight Leg Raise: Negative, bilateral                 Hip Flex Knee Ext Knee Flex Ankle DF GTE Ankle PF Tone   Right +4/5 +4/5 +4/5 +4/5 +4/5 +4/5 +4/5   Left +4/5 +4/5 +4/5 +4/5 +4/5 +4/5 +4/5       ASSESSMENT   Diagnoses and all orders for this visit:    1. Lumbar neuritis    2. Lumbosacral spondylosis without myelopathy    3. DDD (degenerative disc disease), lumbar      IMPRESSION AND PLAN:  Patient returns to the office today with c/o low back pain radiating into the BLE (RLE>LLE) anterolaterally to the knee. Multiple treatment options were discussed. I will increase her Lyrica from 75 mg BID to 150 mg BID. Patient advised to call the office if intolerant to higher dose. She should continue with PT as prescribed. Patient is neurologically intact. I will see the patient back in 1 month's time or earlier if needed. Written by Jez Worrell, as dictated by Tri Esparza MD  I examined the patient, reviewed and agree with the note.

## 2020-08-21 ENCOUNTER — OFFICE VISIT (OUTPATIENT)
Dept: ORTHOPEDIC SURGERY | Age: 60
End: 2020-08-21

## 2020-08-21 ENCOUNTER — HOSPITAL ENCOUNTER (OUTPATIENT)
Dept: PHYSICAL THERAPY | Age: 60
Discharge: HOME | End: 2020-08-21
Payer: COMMERCIAL

## 2020-08-21 VITALS
WEIGHT: 237.2 LBS | BODY MASS INDEX: 38.29 KG/M2 | TEMPERATURE: 97.4 F | DIASTOLIC BLOOD PRESSURE: 79 MMHG | OXYGEN SATURATION: 98 % | SYSTOLIC BLOOD PRESSURE: 129 MMHG | HEART RATE: 99 BPM

## 2020-08-21 DIAGNOSIS — M51.36 DDD (DEGENERATIVE DISC DISEASE), LUMBAR: ICD-10-CM

## 2020-08-21 DIAGNOSIS — M54.16 LUMBAR NEURITIS: Primary | ICD-10-CM

## 2020-08-21 DIAGNOSIS — M47.817 LUMBOSACRAL SPONDYLOSIS WITHOUT MYELOPATHY: ICD-10-CM

## 2020-08-21 PROCEDURE — 97162 PT EVAL MOD COMPLEX 30 MIN: CPT

## 2020-08-21 PROCEDURE — 97110 THERAPEUTIC EXERCISES: CPT

## 2020-08-21 RX ORDER — PREGABALIN 150 MG/1
150 CAPSULE ORAL 2 TIMES DAILY
Qty: 60 CAP | Refills: 1 | Status: SHIPPED | OUTPATIENT
Start: 2020-08-21

## 2020-08-21 NOTE — PROGRESS NOTES
[facility]  [place] [facility] [address]  [phone] fax    Plan of Care/ Statement of Necessity for Physical Therapy Services    Patient name: Mariely Lim Start of Care: 2020   Referral source: Pb Echavarria MD : 1960    Medical Diagnosis: Low back pain [M54.5]  Spondylosis without myelopathy or radiculopathy, lumbosacral region [M47.817]  Radiculopathy, lumbar region [M54.16]  Payor: Stanley Muñoz / Plan: [address] PT / Product Type: Commerical /  Onset Date: Worsening over the past 10 yrs   Treatment Diagnosis: LBP   Prior Hospitalization: see medical history Provider#: [id number]   Medications: Verified on Patient summary List    Comorbidities: Depression, Arthritis, HTN, DM   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 Assessment/ key information: Pt is a 63 yo F c/o low back pain that has worsened over the past 10 yrs. 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 Pt reports that she worked at [employer] a few yrs back, but had to change jobs due to leg giving out after 1-2 hours of standing. She return to work as a CNA for two yrs, but had to discontinue in  due to inability to perform job duties. Pt also reports a fall in  while shopping. She recalls leaning over the cart for relief and legs giving out when she stood up and started walking. She now works as a , but reports decreased tolerance to sitting (<15 minutes). She reports difficulty with ambulation, lateral deviations with ambulation, stair negotiation (must ascend/descend laterally), laying supine/prone, tolerating prolonged sitting, and lprolonged standing. She presents ambulating, Indep, with decreased B toe off. Pt demonstrated decreased lumbar extension, pain at end ranges of all trunk motion, decreased B hip flexion and PF strength, and decreased left knee flex/ext. Pt reported \"cramping and pain\" with heel raises and was tender to palpation along the lumbar paraspinals. She demonstrates decreased H/S flexibility and decreased SLS stability. Supine SLR on right and B Slump Test was positive. Evaluation Complexity History HIGH Complexity :3+ comorbidities / personal factors will impact the outcome/ POC ; Examination MEDIUM Complexity : 3 Standardized tests and measures addressing body structure, function, activity limitation and / or participation in recreation  ;Presentation MEDIUM Complexity : Evolving with changing characteristics  ; Clinical Decision Making MEDIUM Complexity : FOTO score of 26-74  Overall Complexity Rating: MEDIUM  Problem List: pain affecting function, decrease ROM, decrease strength, impaired gait/ balance, decrease ADL/ functional abilitiies, decrease activity tolerance, decrease flexibility/ joint mobility and decrease transfer abilities   Treatment Plan may include any combination of the following: Therapeutic exercise, Therapeutic activities, Neuromuscular re-education, Physical agent/modality, Gait/balance training, Manual therapy, Patient education, Self Care training, Functional mobility training, Home safety training and Stair training  Patient / Family readiness to learn indicated by: asking questions, trying to perform skills and interest  Persons(s) to be included in education: patient (P)  Barriers to Learning/Limitations: None  Patient Goal (s): \"be able to sit more than 15 minutes wihtout pain\"  Patient Self Reported Health Status: good  Rehabilitation Potential: good    Short Term Goals: To be accomplished in 1 weeks:   1. Pt will report compliance with HEP in order to increase therapy outcome. Long Term Goals:  To be accomplished in 5 weeks:   1. Pt will improve FOTO score by 14 points in order to demonstrate significant improvements in functional performance. 2. Pt will improve B hip flexion and plantarflexion strength to 4+/5 in order to increase ease of ambulation. 3. Pt will be able to sit for 30 minutes, pain free, in order to perform work duties. 4. Pt will be able to ambulate 200 ft, indep and pain free, in order to increase ease of community ambulation. Frequency / Duration: Patient to be seen 1-2 times per week for 5 weeks. Patient/ CarPatient/ Caregiver education and instruction: Diagnosis, prognosis, self care, activity modification and exercises   [x]  Plan of care has been reviewed with MADISON Meneses 8/21/2020 10:33 AM  Da Cruz DPT    ________________________________________________________________________    I certify that the above Therapy Services are being furnished while the patient is under my care. I agree with the treatment plan and certify that this therapy is necessary.     Physician's Signature:____________Date:_________TIME:________    ** Signature, Date and Time must be completed for valid certification **    Please sign and return to [facility]  [facility]  [address] [place] [address]  [phone] fax

## 2020-08-21 NOTE — PROGRESS NOTES
PT DAILY TREATMENT NOTE/LUMBAR EVAL 10-18    Patient Name: Mar Lozano  Date:2020  : 1960  [x]  Patient  Verified  Payor: Abbie Richardson / Plan: [employer]  CAPITATED PT / Product Type: Commerical /    In time: 10:37  Out time: 11:16  Total Treatment Time (min): 44  Visit #: 1 of 10    Treatment Area: Low back pain [M54.5]  Spondylosis without myelopathy or radiculopathy, lumbosacral region [M47.817]  Radiculopathy, lumbar region [M54.16]  SUBJECTIVE  Pain Level (0-10 scale): 3/10; taking pain meds  [x]Intermittent pain in the lumbar region that develops into numbness/tingling and can get some bad that her leg gives out. Pain also wakes her up in the night. She describes it as a \"knot like feeling\" when she sleeps on her side. 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 Pt denies onset caused by trauma. Pt reports being able to tolerate 1-2 hours of prolonged standing when she worked at [employer] a couple years, but stated that she had to change jobs due to fall secondary to pain increasing. Pt states that she returned to working as a CNA until  when she had to change jobs again due to tolerance for standing decreasing to 15 minutes. Pt states that she has a fall back in .  He recalls falling after leaning on her basket thinking that she was fine and when she  started walking, she fell. Pt states that the pain caught her off guard. She also reports lateral gait deviations while waling in her home and difficulty with lying prone. Current symptoms/Complaints: difficulty with prolonged sitting and ambuation  Previous Treatment/Compliance: pain medications worked well, but ice/heat doesn't do anything  PMHx/Surgical Hx: denies any previous surgeries  Work Hx: working as a CNA for 2 yrs ago; changed jobs -  and every 15 mintues or so she has to move around (about a month ago)  Living Situation: one story home, lives with  and children  Pt Goals: \"be able to sit more than 15 minutes wihtout pain\"  Cognition: A & O x 3        OBJECTIVE/EXAMINATION    36 min [x]Eval                  []Re-Eval       8 min Therapeutic Exercise:  [x] Education on HEP   Rationale: to ensure pt understanding of HEP in order to increase compliance and safety          With   [x] TE   [] TA   [] neuro   [] other: Patient Education: [x] Review HEP    [] Progressed/Changed HEP based on:   [] positioning   [] body mechanics   [] transfers   [] heat/ice application    [] other:      Physical Therapy Evaluation - Lumbar Spine ([facility])    Symptoms:  Aggravated by:   [] Bending [x] Sitting [x] Standing [x] Walking   [] Moving [] Cough [] Sneeze [] Valsalva   [] AM  [x] PM  Lying:  [x] sup   [x] pro   [] sidelying   [] Other:     Eased by:    [] Bending [] Sitting [] Standing [] Walking   [x] Moving during long periods of sitting [] AM  [] PM  Lying: [] sup  [] pro  [x] sidelying   [] Other: slumping and bending over     General Health:  Red Flags Indicated? [x] Yes    [] No  [] Yes [x] No Recent weight change (If yes, due to dieting?  [] Yes  [] No)   [x] Yes [] No Weakness in legs during walking  [] Yes [x] No Unremitting pain at night  [] Yes [x] No Abdominal pain or problems  [] Yes [x] No Rectal bleeding  [] Yes [x] No Feet more cold or painful in cold weather  [] Yes [x] No Menstrual irregularities  [] Yes [x] No Blood or pain with urination  [] Yes [x] No Dysfunction of bowel or bladder  [] Yes [x] No Recent illness within past 3 weeks (i.e, cold, flu)  [] Yes [x] No Numbness/tingling in buttock/genitalia region    Diagnostic Tests: [] Lab work [x] X-rays    [] CT [] MRI     [] Other:  Results: X-rays showed slight elevated of the right iliac crest, decreased lumbar lordosis, and deviation of the lumbar vertebrae to the right    OBJECTIVE  Posture:   Lateral Shift: [] R    [] L     [] +  [] -  Kyphosis: [] Increased [] Decreased   []  WNL  Lordosis:  [] Increased [x] Decreased   [] WNL  Pelvic symmetry: [] WNL    [] Other:    Gait:  [x] Normal, but decreased B toe off; indep    [] Abnormal:    Active Movements: [] N/A   [] Too acute   [x] Other: pain along the lumbar paraspinals with SB to the left and right  ROM % AROM % PROM Comments:pain, area   Forward flexion 40-60 WNL     Extension 20-30 50%     SB right 20-30 WNL  Bends ipsilateral knee to increase range;Pain at end range   SB left 20-30 WNL  Bends ipsilateral knee to increase range;Pain at end range   Rotation right 5-10 Not assesed     Rotation left 5-10 Not assessed     Finger-tip-to-Floor: 34.5 cm    Repeated Movements   Effects on present pain: produces (CA), abolishes (A), increases (incr), decreases (decr), centralizes (C), peripheral (PH), no effect (NE)   Pre-Test Sx Flexion Repeated Flexion Extension Repeated Extension Repeated SBL Repeated SBR   Sitting CA decr decr       Standing CA decr decr incr N/A incr incr   Lying N/A NA NA NA NA N/A N/A     Neuro Screen [] WNL  Myotome/Dermatome/Reflexes: sensation intact,   Comments:    Dural Mobility:  SLR Supine: [x] R    [] L    [x] +    [] - @ about 45 degrees of hip flexion pt reported tightness in the hamstrings and pain in the hip @ 60 degrees which relieved with knee flexion; tingling reported on R    Slump Test: [x] R    [x] L    [x] +    [] -    Worsened with passive DF    Palpation: tenderness to palpation along the lumbar paraspinals     Strength: assessment limited due to pain and difficulty with laying prone   L(0-5) R (0-5) N/T   Hip Flexion (L1,2) 3 4 []   Knee Extension (L3,4) 4 4+ []   Ankle Dorsiflexion (L4) 5 4+ []   Great Toe Extension (L5)   [x]   Ankle Plantarflexion (S1) 4- 4- []   Knee Flexion (S1,2) 4 4+ []   Upper Abdominals   [x]   Lower Abdominals   [x]   Paraspinals   [x]   Back Rotators   [x]   Gluteus Ramón   [x]   Other   [x]     Special Tests  Lumbar:  Lumb. Compression: [] Pos  [x] Neg                Other tests/comments:  H/S flexibility limited on both sides  SLS balance limited, 15 seconds on left; 5 seconds on the right  Cramp and pain B with PF MMT      Pain Level (0-10 scale) post treatment: 4/10    ASSESSMENT/Changes in Function:   See POC         Progress towards goals / Updated goals:  See POC    PLAN  []  Upgrade activities as tolerated     [x]  Continue plan of care  []  Update interventions per flow sheet       []  Discharge due to:_  []  Other:_      Bernardo Romberg 8/21/2020  10:33 AM  I was present during the entire treatment, directing and participating in the treatment.    Bisi Flores DPT

## 2020-08-21 NOTE — LETTER
8/21/20 Patient: Micah Kahn YOB: 1960 Date of Visit: 8/21/2020 [address]imitrios Edward MD 
[address] [place] [address] K [address] [address] VIA Facsimile: [phone] Dear 241 Dimitrios Edward MD, Thank you for referring Ms. Rodrick Siddiqui to [address] for evaluation. My notes for this consultation are attached. If you have questions, please do not hesitate to call me. I look forward to following your patient along with you. Sincerely, Sarthak Loera MD

## 2020-08-26 ENCOUNTER — HOSPITAL ENCOUNTER (OUTPATIENT)
Dept: PHYSICAL THERAPY | Age: 60
Discharge: HOME | End: 2020-08-26
Payer: COMMERCIAL

## 2020-08-26 PROCEDURE — 97110 THERAPEUTIC EXERCISES: CPT

## 2020-08-26 PROCEDURE — 97112 NEUROMUSCULAR REEDUCATION: CPT

## 2020-08-26 NOTE — PROGRESS NOTES
PT DAILY TREATMENT NOTE 10-18    Patient Name: Lyssa Sylvester  Date:2020  : 1960  [x]  Patient  Verified  Payor: Parth Morin / Plan: [employer]  [facility] / Product Type: Commerical /    In time:5:20  Out time:6:00  Total Treatment Time (min): 40  Visit #: 2 of 10      Treatment Area: Low back pain [M54.5]  Spondylosis without myelopathy or radiculopathy, lumbosacral region [M47.817]  Radiculopathy, lumbar region [M54.16]    SUBJECTIVE  Pain Level (0-10 scale): 8  Any medication changes, allergies to medications, adverse drug reactions, diagnosis change, or new procedure performed?: [x] No    [] Yes (see summary sheet for update)  Subjective functional status/changes:   [] No changes reported  \"I can't take my pain meds before work because they make me sleepy so I am in pain all day most of the time.: I feel better when I bend forward, standing/sitting up or bending back hurts so much worse. \"    OBJECTIVE    30 min Therapeutic Exercise:  [] See flow sheet :   Rationale: increase ROM and increase strength to improve the patients ability to perform ADLs with ease     10 min Neuromuscular Re-education:  []  See flow sheet :   Rationale: improve coordination, improve balance and increase proprioception  to improve the patients ability to stabilize, use proper body mechanics, and promote proper postural awareness          With   [x] TE   [] TA   [x] neuro   [] other: Patient Education: [x] Review HEP    [] Progressed/Changed HEP based on:   [x] positioning   [x] body mechanics   [] transfers   [] heat/ice application    [] other:      Other Objective/Functional Measures:   Initiated POC implemented by PT   Educated pt on mechanics of PPT  Supine stretches eased discomfort    Pain Level (0-10 scale) post treatment: 7    ASSESSMENT/Changes in Function: Patient demonstrated fair tolerance for therapy today; she presents with lack of dissociation of pelvic and l/s requiring max cuing for PPT in seated, standing, and supine. Decreased hamstring length creating hypomobility through l/s flexion. Patient will continue to benefit from skilled PT services to modify and progress therapeutic interventions, address functional mobility deficits, address ROM deficits, address strength deficits, analyze and address soft tissue restrictions, analyze and cue movement patterns, analyze and modify body mechanics/ergonomics, assess and modify postural abnormalities and instruct in home and community integration to attain remaining goals. [x]  See Plan of Care  []  See progress note/recertification  []  See Discharge Summary         Progress towards goals / Updated goals:  Short Term Goals: To be accomplished in 1 weeks:               1. Pt will report compliance with HEP in order to increase therapy outcome.      Current: reports compliance, she does them at work, but hasn't had time to do them at home. Long Term Goals: To be accomplished in 5 weeks:               1. Pt will improve FOTO score by 14 points in order to demonstrate significant improvements in functional performance. 2. Pt will improve B hip flexion and plantarflexion strength to 4+/5 in order to increase ease of ambulation. 3. Pt will be able to sit for 30 minutes, pain free, in order to perform work duties.                 4. Pt will be able to ambulate 200 ft, indep and pain free, in order to increase ease of community ambulation.     PLAN  [x]  Upgrade activities as tolerated     [x]  Continue plan of care  []  Update interventions per flow sheet       []  Discharge due to:_  []  Other:_      SALAZAR Cho 8/26/2020  5:25 PM    Future Appointments   Date Time Provider Ronda Joseph   8/27/2020  5:15 PM Crispin RUSHING [facility]   9/1/2020  6:00 PM Sanjeev Springer [facility]   9/3/2020  5:15 PM Julien Rockwell, PT [facility]   9/8/2020  6:00 PM Vaishnavi Johnson PTA [facility]   9/10/2020  6:00 PM Finn Lackey, PT [facility]   9/15/2020  6:00 PM Emily Chamber, PTA [facility]   9/17/2020  6:00 PM Finn Lackey, PT [facility]   9/22/2020  6:00 PM Lianet Jennings [facility]   9/24/2020  6:00 PM Finn Lackey, PT [facility]   9/25/2020  2:45 PM Vivian Monreal MD [facility]

## 2020-08-27 ENCOUNTER — HOSPITAL ENCOUNTER (OUTPATIENT)
Dept: PHYSICAL THERAPY | Age: 60
Discharge: HOME | End: 2020-08-27
Payer: COMMERCIAL

## 2020-08-27 PROCEDURE — 97140 MANUAL THERAPY 1/> REGIONS: CPT

## 2020-08-27 PROCEDURE — 97112 NEUROMUSCULAR REEDUCATION: CPT

## 2020-08-27 PROCEDURE — 97014 ELECTRIC STIMULATION THERAPY: CPT

## 2020-08-27 PROCEDURE — 97110 THERAPEUTIC EXERCISES: CPT

## 2020-08-27 NOTE — PROGRESS NOTES
PT DAILY TREATMENT NOTE 10-18    Patient Name: Mariely Lim  Date:2020  : 1960  [x]  Patient  Verified  Payor: Stanley Muñoz / Plan: [employer]  [facility] Product Type: Commerical /    In time: 5:20  Out time: 6:29  Total Treatment Time (min): 69  Visit #: 3 of 10    Medicare/BCBS Only   Total Timed Codes (min):  54 1:1 Treatment Time:  54       Treatment Area: Low back pain [M54.5]  Spondylosis without myelopathy or radiculopathy, lumbosacral region [M47.817]  Radiculopathy, lumbar region [M54.16]    SUBJECTIVE  Pain Level (0-10 scale): 5  Any medication changes, allergies to medications, adverse drug reactions, diagnosis change, or new procedure performed?: [x] No    [] Yes (see summary sheet for update)  Subjective functional status/changes:   [] No changes reported  \"My butt and back of my thighs hurt like someone is punching them. I have numbness, but not tingling in my thighs. \"    OBJECTIVE    Modality rationale: decrease inflammation, decrease pain and increase tissue extensibility to improve the patients ability to  tolerate exercises and return to daily activity with ease.    Min Type Additional Details   15 [x] Estim:  []Unatt       [x]IFC  []Premod                        []Other:  [x]w/ice   []w/heat  Position: seated  Location: low back    [] Estim: []Att    []TENS instruct  []NMES                    []Other:  []w/US   []w/ice   []w/heat  Position:  Location:    []  Traction: [] Cervical       []Lumbar                       [] Prone          []Supine                       []Intermittent   []Continuous Lbs:  [] before manual  [] after manual    []  Ultrasound: []Continuous   [] Pulsed                           []1MHz   []3MHz W/cm2:  Location:    []  Iontophoresis with dexamethasone         Location: [] Take home patch   [] In clinic    []  Ice     []  heat  []  Ice massage  []  Laser   []  Anodyne Position:  Location:    []  Laser with stim  []  Other:  Position:  Location:    [] Vasopneumatic Device Pressure:       [] lo [] med [] hi   Temperature: [] lo [] med [] hi   [] Skin assessment post-treatment:  []intact []redness- no adverse reaction    []redness  adverse reaction:     20 min Therapeutic Exercise:  [] See flow sheet :   Rationale: increase ROM and increase strength to improve the patients ability to perform ADLs with ease     26 min Neuromuscular Re-education:  []  See flow sheet :   Rationale: improve coordination, improve balance and increase proprioception  to improve the patients ability to stabilize, use proper body mechanics, and promote proper postural awareness    8 min Manual Therapy:  Check pelvic and sacral alignment; adjust left upslip and left sacral rotation   Rationale: decrease pain, increase ROM, increase tissue extensibility and increase postural awareness to to increase mobility and reduce restriction with ADLs         With   [] TE   [] TA   [] neuro   [] other: Patient Education: [x] Review HEP    [] Progressed/Changed HEP based on:   [] positioning   [] body mechanics   [] transfers   [] heat/ice application    [] other:      Other Objective/Functional Measures:   Trunk flexion and extension exercises incite increase in pain, standing trunk flexion also increases symptoms into bilateral legs   Supine trunk flexion less aggressive symptoms   IFC for pain relief    Pain Level (0-10 scale) post treatment: 3    ASSESSMENT/Changes in Function: Patient continues to experience pain and numbness and/or tingling into bilateral LE with daily activities and during the work day. She reports she consistently does her HEP and alternates between sitting and standing at her desk for work. Will monitor pt results with e-stim to assess appropriateness for home TENS unit.     Patient will continue to benefit from skilled PT services to modify and progress therapeutic interventions, address functional mobility deficits, address ROM deficits, address strength deficits, analyze and address soft tissue restrictions, analyze and cue movement patterns, analyze and modify body mechanics/ergonomics, assess and modify postural abnormalities, address imbalance/dizziness and instruct in home and community integration to attain remaining goals. [x]  See Plan of Care  []  See progress note/recertification  []  See Discharge Summary         Progress towards goals / Updated goals:  Short Term Goals: To be accomplished in 1 weeks:               1. Pt will report compliance with HEP in order to increase therapy outcome.               Current: reports compliance, she does them at work, but hasn't had time to do them at home. Long Term Goals: To be accomplished in 5 weeks:               1. Pt will improve FOTO score by 14 points in order to demonstrate significant improvements in functional performance.                2. Pt will improve B hip flexion and plantarflexion strength to 4+/5 in order to increase ease of ambulation.               3. Pt will be able to sit for 30 minutes, pain free, in order to perform work duties.                 4. Pt will be able to ambulate 200 ft, indep and pain free, in order to increase ease of community ambulation.       PLAN  [x]  Upgrade activities as tolerated     [x]  Continue plan of care  []  Update interventions per flow sheet       []  Discharge due to:_  []  Other:_      SALAZAR Rodriguez 8/27/2020  5:22 PM    Future Appointments   Date Time Provider Ronda Joseph   9/1/2020  6:00 PM Yury Nguyen [facility]   9/3/2020  5:15 PM Lui Pump, PT [facility]   9/8/2020  6:00 PM Cher Guy, [place] [facility]   9/10/2020  6:00 PM Lui Pump, PT [facility]   9/15/2020  6:00 PM Cher Guy, PTA [facility]   9/17/2020  6:00 PM Lui Pump, PT [facility]   9/22/2020  6:00 PM Cher Guy, PTA [facility]   9/24/2020  6:00 PM Lui Pump, PT [facility]   9/25/2020  2:45 PM Ramon Gomez MD [facility]

## 2020-09-01 ENCOUNTER — HOSPITAL ENCOUNTER (OUTPATIENT)
Dept: PHYSICAL THERAPY | Age: 60
Discharge: HOME | End: 2020-09-01
Payer: COMMERCIAL

## 2020-09-01 PROCEDURE — 97140 MANUAL THERAPY 1/> REGIONS: CPT

## 2020-09-01 PROCEDURE — 97014 ELECTRIC STIMULATION THERAPY: CPT

## 2020-09-01 PROCEDURE — 97110 THERAPEUTIC EXERCISES: CPT

## 2020-09-01 NOTE — PROGRESS NOTES
PT DAILY TREATMENT NOTE 10-18    Patient Name: Ivone Torres  Date:2020  : 1960  [x]  Patient  Verified  Payor: Isabelle Mosher / Plan: [employer]  [facility] / Product Type: Commerical /    In time: 6:00   Out time: 7:05  Total Treatment Time (min): 65  Visit #: 4 of 10      Treatment Area: Low back pain [M54.5]  Spondylosis without myelopathy or radiculopathy, lumbosacral region [M47.817]  Radiculopathy, lumbar region [M54.16]    SUBJECTIVE  Pain Level (0-10 scale): 3/10  Any medication changes, allergies to medications, adverse drug reactions, diagnosis change, or new procedure performed?: [x] No    [] Yes (see summary sheet for update)  Subjective functional status/changes:   [] No changes reported  Pt reports decreased pain today but also has already taken pain medication and had the MD increase in her lyrica dosage. She states she wants to be able to walk again and used to walk 4 miles a day but can't even tolerate a few minutes now. She does have a stationary bike. She tends to shift her weight in sitting and cross her legs for pressure relief but doesn't find much. She gets pain relieve on her inversion table for her back. OBJECTIVE    Modality rationale: decrease inflammation, decrease pain and increase tissue extensibility to improve the patients ability to  tolerate exercises and return to daily activity with ease.    Min Type Additional Details   15 [x] Estim:  []Unatt       [x]IFC  []Premod                        []Other:  [x]w/ice   []w/heat  Position: seated  Location: low back/sacrum    [] Estim: []Att    []TENS instruct  []NMES                    []Other:  []w/US   []w/ice   []w/heat  Position:  Location:    []  Traction: [] Cervical       []Lumbar                       [] Prone          []Supine                       []Intermittent   []Continuous Lbs:  [] before manual  [] after manual    []  Ultrasound: []Continuous   [] Pulsed                           []1MHz   []3MHz W/cm2:  Location:    []  Iontophoresis with dexamethasone         Location: [] Take home patch   [] In clinic    []  Ice     []  heat  []  Ice massage  []  Laser   []  Anodyne Position:  Location:    []  Laser with stim  []  Other:  Position:  Location:    []  Vasopneumatic Device Pressure:       [] lo [] med [] hi   Temperature: [] lo [] med [] hi   [x] Skin assessment post-treatment:  [x]intact []redness- no adverse reaction    []redness  adverse reaction:     35 min Therapeutic Exercise:  [] See flow sheet :   Rationale: increase ROM and increase strength to improve the patients ability to perform ADLs with ease     15 min Manual Therapy: leg lengthening x 10 for left upslip; MET for right AI; shotgun technique; MET for left/left sacral torsion; KT to inhibit l/s paraspinals    Rationale: decrease pain, increase ROM, increase tissue extensibility and increase postural awareness to to increase mobility and reduce restriction with ADLs         With   [] TE   [] TA   [] neuro   [] other: Patient Education: [x] Review HEP    [] Progressed/Changed HEP based on:   [] positioning   [] body mechanics   [] transfers   [] heat/ice application    [] other:      Other Objective/Functional Measures:    In standing pt has increased anterior pelvic tilt with knees locked in extension causing large l/s lordosis  Hypersensitive to palpation in the l/s paraspinals   B piriformis and hip flexor hypertonicity right>left  Educated on SIJD as pt was TTP B SI joints and use of ice to decrease inflammation  Educated on adding stretching to restore neutral alignment  Instructed in side sleeping posture with pillow between legs  Educated on not crossing legs in sitting    Pain Level (0-10 scale) post treatment: 1/10    ASSESSMENT/Changes in Function: Mrs. Tari Aldrich continues with fluctuating pain levels due to modification of medication from the MD. She has a pelvic obliquity likely due to weight shifting for pain and crossing her legs in sitting along with global hip/core weakness. She has increased anterior pelvic tilt posture contributing to l/s and hip flexor tightness. She does have B piriformis hypertonicity right>left and gets B sciatic symptoms that are unchanged by flexion or extension indicating likely hip contributing factor with sacral alignment. Skilled PT is medically necessary to restore a neutral pelvis alignment with improved core/hip strength to maintain alignment for ease of sitting and walking to return to PLOF. Progress towards goals / Updated goals:  Short Term Goals: To be accomplished in 1 weeks:               1. Pt will report compliance with HEP in order to increase therapy outcome.               Current: reports compliance, she does them at work, but hasn't had time to do them at home. Long Term Goals: To be accomplished in 5 weeks:               1. Pt will improve FOTO score by 14 points in order to demonstrate significant improvements in functional performance.                2. Pt will improve B hip flexion and plantarflexion strength to 4+/5 in order to increase ease of ambulation.               3. Pt will be able to sit for 30 minutes, pain free, in order to perform work duties.                 4. Pt will be able to ambulate 200 ft, indep and pain free, in order to increase ease of community ambulation.     PLAN  [x]  Upgrade activities as tolerated     [x]  Continue plan of care  []  Update interventions per flow sheet       []  Discharge due to:_  []  Other:_      Angy Horanelor, SALAZAR 9/1/2020  5:22 PM    Future Appointments   Date Time Provider Ronda Joseph   9/1/2020  6:00 PM [facility]   9/3/2020  5:15 PM Oscar Fernandes, PT [facility]   9/8/2020  6:00 PM Emelina Mina, PTA [facility]   9/10/2020  6:00 PM Oscar Fernandes, PT [facility]   9/15/2020  6:00 PM Emelina Mina PTA [facility]   9/17/2020  6:00 PM Oscar Fernandes, PT [facility]   9/22/2020 6:00 PM Mook Jerry [facility]   9/24/2020  6:00 PM Jaelyn Ahuja PT [facility]   9/25/2020  2:45 PM Clifton Borrego MD [facility]

## 2020-09-03 ENCOUNTER — HOSPITAL ENCOUNTER (OUTPATIENT)
Dept: PHYSICAL THERAPY | Age: 60
Discharge: HOME | End: 2020-09-03
Payer: COMMERCIAL

## 2020-09-03 PROCEDURE — 97110 THERAPEUTIC EXERCISES: CPT

## 2020-09-03 PROCEDURE — 97112 NEUROMUSCULAR REEDUCATION: CPT

## 2020-09-03 NOTE — PROGRESS NOTES
PT DAILY TREATMENT NOTE 10-18    Patient Name: Stephan Sender  Date:9/3/2020  : 1960  [x]  Patient  Verified  Payor: Vaibhav Turcios / Plan: [employer]  [facility] Type: Commerical /    In time: 5:21  Out time: 5:55  Total Treatment Time (min): 34  Visit #: 5 of 10    Treatment Area: Low back pain [M54.5]  Spondylosis without myelopathy or radiculopathy, lumbosacral region [M47.817]  Radiculopathy, lumbar region [M54.16]    SUBJECTIVE  Pain Level (0-10 scale):  3/10  Any medication changes, allergies to medications, adverse drug reactions, diagnosis change, or new procedure performed?: [x] No    [] Yes (see summary sheet for update)  Subjective functional status/changes:   [] No changes reported  Pt. Reports she is doing a little better. She reports she continues to have difficulty with sleeping. OBJECTIVE    24 min Therapeutic Exercise:  [x] See flow sheet :   Rationale: increase ROM and increase strength to improve the patients ability to increase ease of ADLs    10 min: neuromuscular re-ed: polo disk activities  Rationale: improve strength for increased ease of working. With   [x] TE   [] TA   [] neuro   [] other: Patient Education: [x] Review HEP    [] Progressed/Changed HEP based on:   [] positioning   [] body mechanics   [] transfers   [] heat/ice application    [] other:      Other Objective/Functional Measures:   Sitting tolerance: 30 minutes  Slight left upslip noted and she self corrected with left leg lengthening    Improved tolerance to laying supine for exercises tday  She tolerated PT well with no reports of increased pain    Pain Level (0-10 scale) post treatment: 3/10    ASSESSMENT/Changes in Function:  Pt. Is progressing slowly towards goals. She is having less pain overall and has improving sitting tolerance at work. She continues to demonstrate decreased core stability and decreased hip strength.      Patient will continue to benefit from skilled PT services to modify and progress therapeutic interventions, address functional mobility deficits, address ROM deficits, address strength deficits, analyze and address soft tissue restrictions, analyze and cue movement patterns and analyze and modify body mechanics/ergonomics to attain remaining goals. Progress towards goals / Updated goals:  Short Term Goals: To be accomplished in 1 weeks:               1. Pt will report compliance with HEP in order to increase therapy outcome.               Current: reports compliance, she does them at work, but hasn't had time to do them at home. Long Term Goals: To be accomplished in 5 weeks:               1. Pt will improve FOTO score by 14 points in order to demonstrate significant improvements in functional performance.                2. Pt will improve B hip flexion and plantarflexion strength to 4+/5 in order to increase ease of ambulation.               3. Pt will be able to sit for 30 minutes, pain free, in order to perform work duties.    Met: 30 minutes (9/3/20)               4. Pt will be able to ambulate 200 ft, indep and pain free, in order to increase ease of community ambulation.     PLAN  []  Upgrade activities as tolerated     [x]  Continue plan of care  []  Update interventions per flow sheet       []  Discharge due to:_  []  Other:_      Josh Austin, PT 9/3/2020  8:56 AM    Future Appointments   Date Time Provider Ronda Joseph   9/3/2020  5:15 PM Florina Summers, PT [facility]   9/8/2020  6:00 PM Clarice Berman, PTA [facility]   9/10/2020  6:00 PM Florina Summers, PT [facility]   9/15/2020  6:00 PM Clarice Berman, PTA [facility]   9/17/2020  6:00 PM Florina Summers, PT [facility]   9/22/2020  6:00 PM Clarice Berman, PTA [facility]   9/24/2020  6:00 PM Florina Summers, PT [facility]   9/25/2020  2:45 PM Kwaku Ortiz MD [facility]

## 2020-09-10 ENCOUNTER — HOSPITAL ENCOUNTER (OUTPATIENT)
Dept: PHYSICAL THERAPY | Age: 60
Discharge: HOME | End: 2020-09-10
Payer: COMMERCIAL

## 2020-09-10 PROCEDURE — 97110 THERAPEUTIC EXERCISES: CPT

## 2020-09-10 PROCEDURE — 97112 NEUROMUSCULAR REEDUCATION: CPT

## 2020-09-10 NOTE — PROGRESS NOTES
PT DAILY TREATMENT NOTE 10-18    Patient Name: Mickey Zamora  Date:9/10/2020  : 1960  [x]  Patient  Verified  Payor: Berta Núñez / Plan: [place] [employer]  CAPITATED PT / Product Type: Commerical /    In time: 6:03  Out time: 6:53  Total Treatment Time (min): 50  Visit #: 7 of 10    Treatment Area: Low back pain [M54.5]  Spondylosis without myelopathy or radiculopathy, lumbosacral region [M47.817]  Radiculopathy, lumbar region [M54.16]    SUBJECTIVE  Pain Level (0-10 scale):  6/10  Any medication changes, allergies to medications, adverse drug reactions, diagnosis change, or new procedure performed?: [x] No    [] Yes (see summary sheet for update)  Subjective functional status/changes:   [x] No changes reported  Pt. Reports she is having a lot of pain today.      OBJECTIVE    Modality rationale: decrease pain and increase tissue extensibility to improve the patients ability to increase ease of ADLs   Min Type Additional Details    [] Estim:  []Unatt       []IFC  []Premod                        []Other:  []w/ice   []w/heat  Position:  Location:    [] Estim: []Att    []TENS instruct  []NMES                    []Other:  []w/US   []w/ice   []w/heat  Position:  Location:    []  Traction: [] Cervical       []Lumbar                       [] Prone          []Supine                       []Intermittent   []Continuous Lbs:  [] before manual  [] after manual    []  Ultrasound: []Continuous   [] Pulsed                           []1MHz   []3MHz W/cm2:  Location:    []  Iontophoresis with dexamethasone         Location: [] Take home patch   [] In clinic   10 [x]  Ice     []  heat  []  Ice massage  []  Laser   []  Anodyne Position: seated  Location: LBP    []  Laser with stim  []  Other:  Position:  Location:    []  Vasopneumatic Device Pressure:       [] lo [] med [] hi   Temperature: [] lo [] med [] hi   [x] Skin assessment post-treatment:  [x]intact []redness- no adverse reaction    []redness  adverse reaction:     30 min Therapeutic Exercise:  [x] See flow sheet :   Rationale: increase ROM and increase strength to improve the patients ability to increase ease of ambulation      10 min Neuromuscular Re-education:  [x]  See flow sheet : polo disk exercises    Rationale: increase strength, improve coordination and improve balance  to improve the patients ability to increase ease of sitting          With   [x] TE   [] TA   [] neuro   [] other: Patient Education: [x] Review HEP    [] Progressed/Changed HEP based on:   [] positioning   [] body mechanics   [] transfers   [] heat/ice application    [] other:      Other Objective/Functional Measures:   Hip MMT flex: right: 4-/5 left: 4/5  PF: right: 5/5 left: 5/5   Negative ASLR test bilaterally  Mild left upslip corrected with left leg lengthening with no significant change in pain after  Increased pain with attempted SIJ compression during ambulation  She was challenged with polo disk marching    Pain Level (0-10 scale) post treatment:  4/10    ASSESSMENT/Changes in Function:  Pt. Is progressing slowly towards goals. She continues to have fluctuating pain and increased pain with prolonged sitting and standing. She does demonstrate improving B hip flexion strength and B PF strength today. She has increased pain with SIJ compression during ambulation and negative ASLR test.     Patient will continue to benefit from skilled PT services to modify and progress therapeutic interventions, address functional mobility deficits, address ROM deficits, address strength deficits, analyze and address soft tissue restrictions, analyze and cue movement patterns, analyze and modify body mechanics/ergonomics and assess and modify postural abnormalities to attain remaining goals. Progress towards goals / Updated goals:  Short Term Goals: To be accomplished in 1 weeks:               1.  Pt will report compliance with HEP in order to increase therapy outcome.               Current: reports compliance, she does them at work, but hasn't had time to do them at home.  [address] be accomplished in 5 weeks:               1. Pt will improve FOTO score by 14 points in order to demonstrate significant improvements in functional performance.                2. Pt will improve B hip flexion and plantarflexion strength to 4+/5 in order to increase ease of ambulation. Progressing: B PF strength: 5/5, hip flexion improved right: 4-/5 left: 4/5 (9/10/20)               3. Pt will be able to sit for 30 minutes, pain free, in order to perform work duties.    Met: 30 minutes (9/3/20)               4. Pt will be able to ambulate 200 ft, indep and pain free, in order to increase ease of community ambulation.     PLAN  []  Upgrade activities as tolerated     [x]  Continue plan of care  []  Update interventions per flow sheet       []  Discharge due to:_  []  Other:_      Josh Austin, PT 9/10/2020  8:56 AM    Future Appointments   Date Time Provider Ronda Joseph   9/10/2020  6:00 PM Ayleen Alegria [facility]   9/15/2020  6:00 PM Clarice Berman PTA [facility]   9/17/2020  6:00 PM Florina Summers, PT [facility]   9/22/2020  6:00 PM Clarice Berman PTA [facility]   9/24/2020  6:00 PM Florina Summers, PT [facility]   9/25/2020  2:45 PM Kwaku Ortiz MD [facility]

## 2020-09-15 ENCOUNTER — HOSPITAL ENCOUNTER (OUTPATIENT)
Dept: PHYSICAL THERAPY | Age: 60
Discharge: HOME | End: 2020-09-15
Payer: COMMERCIAL

## 2020-09-15 PROCEDURE — 97110 THERAPEUTIC EXERCISES: CPT

## 2020-09-15 NOTE — PROGRESS NOTES
PT DAILY TREATMENT NOTE 10-18    Patient Name: Amy Landrum  Date:9/15/2020  : 1960  [x]  Patient  Verified  Payor: Mckenzie Khalil / [place]: [place] [employer]  [employer] PT / Product Type: Commerical /    In time: 6:09   Out time: 6:50  Total Treatment Time (min): 41  Visit #: 8 of 10    Treatment Area: Low back pain [M54.5]  Spondylosis without myelopathy or radiculopathy, lumbosacral region [M47.817]  Radiculopathy, lumbar region [M54.16]    SUBJECTIVE  Pain Level (0-10 scale): 3/10    Any medication changes, allergies to medications, adverse drug reactions, diagnosis change, or new procedure performed?: [x] No    [] Yes (see summary sheet for update)  Subjective functional status/changes:   [x] No changes reported  Pt reports she had an episode today when on the computer that her arms froze on both sides and had tingling sensation; she reports a history of pinched nerve in her neck. She states her hip still bothers her but not as bad today. OBJECTIVE    41 min Therapeutic Exercise:  [x] See flow sheet :   Rationale: increase ROM and increase strength to improve the patients ability to increase ease of ambulation      KT to inhibit B l/s paraspinals for ease of ambulation          With   [x] TE   [] TA   [] neuro   [] other: Patient Education: [x] Review HEP    [] Progressed/Changed HEP based on:   [] positioning   [] body mechanics   [] transfers   [] heat/ice application    [] other:      Other Objective/Functional Measures:    In standing increased extension moment of trunk  Educated with mirror for feedback to increase core activation to reduce rib flare and better facilitate core for decompression of the spine  Reviewed that it is okay to stretch piriformis and that you have to cross your leg for the stretch  Added core strengthening today with hollow tuck hold to build endurance in neutral spine  Need to add deep hip strengthening IR/ER next session    Pain Level (0-10 scale) post treatment: 3/10    ASSESSMENT/Changes in Function:  Pt continues with fluctuating piriformis pain. She continues with hypertonicity of the piriformis, l/s paraspinals and hip flexors. She has decreased core, glute and deep hip strength for stability. She has increased trunk extension in standing as compensation for weight of her busts which increased l/s compression. Skilled PT is medically necessary to progress endurance and strength for reduced pain for ease of ambulation and standing. Progress towards goals / Updated goals:  Short Term Goals: To be accomplished in 1 weeks:               1. Pt will report compliance with HEP in order to increase therapy outcome.               Current: reports compliance, she does them at work, but hasn't had time to do them at home.  [address] be accomplished in 5 weeks:               1. Pt will improve FOTO score by 14 points in order to demonstrate significant improvements in functional performance.                2. Pt will improve B hip flexion and plantarflexion strength to 4+/5 in order to increase ease of ambulation. Progressing: B PF strength: 5/5, hip flexion improved right: 4-/5 left: 4/5 (9/10/20)               3. Pt will be able to sit for 30 minutes, pain free, in order to perform work duties.    Met: 30 minutes (9/3/20)               4. Pt will be able to ambulate 200 ft, indep and pain free, in order to increase ease of community ambulation.     PLAN  [x]  Upgrade activities as tolerated     [x]  Continue plan of care  []  Update interventions per flow sheet       []  Discharge due to:_  []  Other:_      Brenna Hernadez PTA 9/15/2020  8:56 AM    Future Appointments   Date Time Provider Ronda Joseph   9/15/2020  6:00 PM Portillo John [facility]   9/17/2020  6:00 PM Larwence Pacer, PT [facility]   9/22/2020  6:00 PM Erlinda Lowery PTA [facility]   9/24/2020  6:00 PM Larwence Pacer, PT [facility]   9/25/2020  2:45 PM MD Blayne Rosas

## 2020-09-17 ENCOUNTER — HOSPITAL ENCOUNTER (OUTPATIENT)
Dept: PHYSICAL THERAPY | Age: 60
Discharge: HOME | End: 2020-09-17
Payer: COMMERCIAL

## 2020-09-17 PROCEDURE — 97110 THERAPEUTIC EXERCISES: CPT

## 2020-09-17 NOTE — PROGRESS NOTES
[facility]  [address]  [phone] fax    Physical Therapy Progress Note  Patient name: Keith Cooper Start of Care: 2020   Referral source: Trevon Reyes MD : 1960                Medical Diagnosis: Low back pain [M54.5]  Spondylosis without myelopathy or radiculopathy, lumbosacral region [M47.817]  Radiculopathy, lumbar region [M54.16]  Payor: Juan Antonio Bill / Plan: [address] PT / Product Type: Commerical /  Onset Date: Worsening over the past 10 yrs   Treatment Diagnosis: LBP   Prior Hospitalization: see medical history Provider#: [id number]   Medications: Verified on Patient summary List    Comorbidities: Depression, Arthritis, HTN, DM   Prior Level of Function: worked as a CNA for 20 yrs, no difficulty with prolonged ambulation/standing    Visits from Start of Care: 9    Missed Visits: 0    Established Goals:         Excellent           Good         Limited           None  [x] Increased ROM   []  []  [x]  []  [x] Increased Strength  []  []  [x]  []  [x] Increased Mobility  []  []  [x]  []   [x] Decreased Pain   []  []  [x]  []  [] Decreased Swelling  []  []  []  []    Key Functional Changes:  Pt. Is progressing slowly towards goals. She reports some improvement in her back pain but she continues to have significant pain in her hips. She also notes some improvement in her sitting tolerance at about 30 minutes. Pt. Continues to have significant pain with ambulation so has not been able to establish a walking program yet. FOTO score had no significant change at 32 points. Strength improved for B ankle PF right: 4/5 left: 4+/5. B hip flexion strength also improved right: 4-/5 left: 4/5. Skilled PT is medically necessary in order to improve strength and decrease pain for increased ease of working and improved quality of life. Updated Goals: to be achieved in 4 weeks:  1.  Patient will improve FOTO score by 14 points in order to demonstrate a significant improvement in function. 2. Patient will report moderate difficulty for performing her usual work activities in order to improve quality of life. 3. Patient will improve ambulation tolerance to 10 minutes in order to establish a walking program for improved pain management. 4. Patient will improve right hip flexion MMT to 4/5 in order to increase ease of ADLs. ASSESSMENT/RECOMMENDATIONS:  [x]Continue therapy per initial plan/protocol at a frequency of  2 x per week for 4 weeks  []Continue therapy with the following recommended changes:_____________________      _____________________________________________________________________  []Discontinue therapy progressing towards or have reached established goals  []Discontinue therapy due to lack of appreciable progress towards goals  []Discontinue therapy due to lack of attendance or compliance  []Await Physician's recommendations/decisions regarding therapy  []Other:________________________________________________________________    Thank you for this referral.   Genesis Caballero, PT 9/17/2020 6:49 PM    NOTE TO PHYSICIAN:  PLEASE COMPLETE THE ORDERS BELOW AND   FAX TO [facility]: [phone]  If you are unable to process this request in 24 hours please contact our office: [phone] I have read the above report and request that my patient continue as recommended. ? I have read the above report and request that my patient continue therapy with the following changes/special instructions:____________________________________  ? I have read the above report and request that my patient be discharged from therapy.     Physicians signature: ______________________________Date: ______Time:______

## 2020-09-17 NOTE — PROGRESS NOTES
PT DAILY TREATMENT NOTE 10-18    Patient Name: Vazquez Garcia  Date:2020  : 1960  [x]  Patient  Verified  Payor: Kimberly Edmonds / Plan: [place] [employer]  CAPITATED PT / Product Type: Commerical /    In time: 6:00  Out time: 6:42  Total Treatment Time (min): 42  Visit #: 9 of 10    Treatment Area: Low back pain [M54.5]  Spondylosis without myelopathy or radiculopathy, lumbosacral region [M47.817]  Radiculopathy, lumbar region [M54.16]    SUBJECTIVE  Pain Level (0-10 scale):  2/10 back 7/10 hips  Any medication changes, allergies to medications, adverse drug reactions, diagnosis change, or new procedure performed?: [x] No    [] Yes (see summary sheet for update)  Subjective functional status/changes:   [] No changes reported  Pt. Reports her back has been doing better but she continues to have pain in her hips. OBJECTIVE    42 min Therapeutic Exercise:  [x] See flow sheet :   Rationale: increase ROM and increase strength to improve the patients ability to increase ease of working          With   [x] TE   [] TA   [] neuro   [] other: Patient Education: [x] Review HEP    [] Progressed/Changed HEP based on:   [] positioning   [] body mechanics   [] transfers   [] heat/ice application    [] other:      Other Objective/Functional Measures: FOTO: 28  PF MMT right: 4/10 left: 4+/5  Hip flexion MMT right: 4-/5 left: 4/5  Pt.  Has pain in right hip with attempted polo disc marching with left hip so this was stopped  She tolerated single knee to chest well and was educated on this for HEP       Pain Level (0-10 scale) post treatment:  7/10    ASSESSMENT/Changes in Function:     See progress note     Progress towards goals / Updated goals:  See progress note    PLAN  []  Upgrade activities as tolerated     [x]  Continue plan of care  []  Update interventions per flow sheet       []  Discharge due to:_  []  Other:_      Genesis Caballero, PT 2020  8:37 AM    Future Appointments   Date Time Provider Department Mobile   9/17/2020  6:00 PM Kalia Muse, PT [facility]   9/22/2020  6:00 PM Wanda Hart [facility]   9/24/2020  6:00 PM Kalia Muse, PT [facility]   9/25/2020  2:45 PM Galileo Haro MD [facility]

## 2020-09-22 ENCOUNTER — HOSPITAL ENCOUNTER (OUTPATIENT)
Dept: PHYSICAL THERAPY | Age: 60
Discharge: HOME | End: 2020-09-22
Payer: COMMERCIAL

## 2020-09-22 PROCEDURE — 97110 THERAPEUTIC EXERCISES: CPT

## 2020-09-22 NOTE — PROGRESS NOTES
PT DAILY TREATMENT NOTE 10-18    Patient Name: Mickey Zamora  Date:2020  : 1960  [x]  Patient  Verified  Payor: Berta Núñez / Plan: [place] [employer]  [employer] PT / Product Type: Commerical /    In time: 6:00   Out time: 6:45  Total Treatment Time (min): 45   Visit #: 1 of 8    Treatment Area: Low back pain [M54.5]  Spondylosis without myelopathy or radiculopathy, lumbosacral region [M47.817]  Radiculopathy, lumbar region [M54.16]    SUBJECTIVE  Pain Level (0-10 scale): 3/10  Any medication changes, allergies to medications, adverse drug reactions, diagnosis change, or new procedure performed?: [x] No    [] Yes (see summary sheet for update)  Subjective functional status/changes:   [] No changes reported  Pt continues with fluctuating pain reports. She states today it is the side of her hip and that her low back really has been feeling fine. She has increased pain with walking in her groin and buttocks area and has been getting swollen on the side of her hip. She states no one has ever looked at her hip or taken xrays of it they have always just focused on her back. Her mom has a history of knee replacements.        OBJECTIVE    45 min Therapeutic Exercise:  [x] See flow sheet :   Rationale: increase ROM and increase strength to improve the patients ability to increase ease of working          With   [x] TE   [] TA   [] neuro   [] other: Patient Education: [x] Review HEP    [] Progressed/Changed HEP based on:   [] positioning   [] body mechanics   [] transfers   [] heat/ice application    [] other:      Other Objective/Functional Measures:   Spent some time talking with pt about potential of seeing ortho to rule out her hip as the main cause of her pain versus the back  She has pain with active hip flexion in the groin, she has deep hip pain with PROM into abduction and only has 3-5/ hip abduction strength, she has (+) hip scouring test, and she has loss of hip IR with increased pain performing    Re-educated on ice to greater trochanter for swelling  Discussed adding some gentle hip strengthening in upcoming sessions for stability and to see if it would help decrease pain    No reports of back pain during session    Pain Level (0-10 scale) post treatment:  3/10    ASSESSMENT/Changes in Function: Pt is making slow progress towards updated goals. She has had limited back pain but more reports of right hip pain in the buttocks, lateral hip and groin that may warrant further diagnostic testing with an orthopedic MD. She has pain with prolonged ambulation, swelling at the greater trochanter, loss of hip flexion, IR and abduction mobility with deep hip pain and 3-/5 hip abduction strength. She has hypertonicity of the right piriformis and a pelvic obliquity likely from her altered gait mechanics due to pain. Will continue with core/hip strengthening within painfree range but suggest a return to the MD to look at the right hip for possibly testing.        Updated Goals: to be achieved in 4 weeks:  1. Patient will improve FOTO score by 14 points in order to demonstrate a significant improvement in function. 2. Patient will report moderate difficulty for performing her usual work activities in order to improve quality of life. 3. Patient will improve ambulation tolerance to 10 minutes in order to establish a walking program for improved pain management. Continues to have decreased ambulation tolerance  4. Patient will improve right hip flexion MMT to 4/5 in order to increase ease of ADLs.       PLAN  [x]  Upgrade activities as tolerated     [x]  Continue plan of care  []  Update interventions per flow sheet       []  Discharge due to:_  []  Other:_      Regan Olivia, [place] 9/22/2020  8:37 AM    Future Appointments   Date Time Provider Ronda Joseph   9/22/2020  6:00 PM Mook Jerry [facility] [address]   9/24/2020  6:00 PM Jaelyn Ahuja, PT [facility] [address]   10/5/2020  1:45 PM Samantha Galo MD [facility]

## 2020-09-24 ENCOUNTER — HOSPITAL ENCOUNTER (OUTPATIENT)
Dept: PHYSICAL THERAPY | Age: 60
Discharge: HOME | End: 2020-09-24
Payer: COMMERCIAL

## 2020-09-24 PROCEDURE — 97110 THERAPEUTIC EXERCISES: CPT

## 2020-09-24 PROCEDURE — 97112 NEUROMUSCULAR REEDUCATION: CPT

## 2020-09-24 NOTE — PROGRESS NOTES
PT DAILY TREATMENT NOTE 10-18    Patient Name: Naida Davila  Date:2020  : 1960  [x]  Patient  Verified  Payor: Didi Maria / Plan: [place] [employer]  CAPITATED PT / Product Type: Commerical /    In time: 6:02  Out time: 6:36  Total Treatment Time (min): 34  Visit #: 2 of 8    Treatment Area: Low back pain [M54.5]  Spondylosis without myelopathy or radiculopathy, lumbosacral region [M47.817]  Radiculopathy, lumbar region [M54.16]    SUBJECTIVE  Pain Level (0-10 scale):  2/10 back, 6/10 right hip  Any medication changes, allergies to medications, adverse drug reactions, diagnosis change, or new procedure performed?: [x] No    [] Yes (see summary sheet for update)  Subjective functional status/changes:   [] No changes reported  Pt. Reports her back is doing better but she continues to have a lot of pain in her hips right more than left. She also reports occasional symptoms     OBJECTIVE    26 min Therapeutic Exercise:  [x] See flow sheet :   Rationale: increase ROM and increase strength to improve the patients ability to increase ease of ADLs     8 min Neuromuscular Re-education:  [x]  See flow sheet : seated polo disc activities    Rationale: increase strength, improve coordination and improve balance  to improve the patients ability to increase ease of ambulation           With   [x] TE   [] TA   [] neuro   [] other: Patient Education: [x] Review HEP    [] Progressed/Changed HEP based on:   [] positioning   [] body mechanics   [] transfers   [] heat/ice application    [] other:      Other Objective/Functional Measures:   Pt. Tolerated PT well with no reports of increased pain  She reports usually having severe pain in adductors the next day after PT, so she was educated on decreasing pressuring during ball squeeze  She has tenderness to palpation over greater trochanter and glute med.    Hypersensitivity to palpation over lateral hip     Pain Level (0-10 scale) post treatment:  2/10    ASSESSMENT/Changes in Function: pt. Is progressing slowly towards goals. She continues to have increased pain with prolonged sitting at work. Her back pain is gradually improving but she continues to have symptoms into her hips. She also continues to have decreased ambulation tolerance. Patient will continue to benefit from skilled PT services to modify and progress therapeutic interventions, address functional mobility deficits, address ROM deficits, address strength deficits, analyze and address soft tissue restrictions, analyze and cue movement patterns and analyze and modify body mechanics/ergonomics to attain remaining goals. Progress towards goals / Updated goals:  1. Patient will improve FOTO score by 14 points in order to demonstrate a significant improvement in function. 2. Patient will report moderate difficulty for performing her usual work activities in order to improve quality of life. 3. Patient will improve ambulation tolerance to 10 minutes in order to establish a walking program for improved pain management. Continues to have decreased ambulation tolerance (9/24/20  4. Patient will improve right hip flexion MMT to 4/5 in order to increase ease of ADLs.       PLAN  []  Upgrade activities as tolerated     [x]  Continue plan of care  []  Update interventions per flow sheet       []  Discharge due to:_  []  Other:_      Moi Tucker, PT 9/24/2020  8:10 AM    Future Appointments   Date Time Provider Ronda Joseph   9/24/2020  6:00 PM Ayleen Sheriff [facility]   10/5/2020  1:45 PM Ty Galo MD [facility]

## 2020-10-20 ENCOUNTER — HOSPITAL ENCOUNTER (OUTPATIENT)
Dept: PHYSICAL THERAPY | Age: 60
Discharge: HOME | End: 2020-10-20
Payer: COMMERCIAL

## 2020-10-20 PROCEDURE — 97110 THERAPEUTIC EXERCISES: CPT

## 2020-10-20 PROCEDURE — 97530 THERAPEUTIC ACTIVITIES: CPT

## 2020-10-20 PROCEDURE — 97112 NEUROMUSCULAR REEDUCATION: CPT

## 2020-10-20 NOTE — PROGRESS NOTES
PT DAILY TREATMENT NOTE 10-18    Patient Name: Jamal Wills  UTSR:  : 1960  [x]  Patient  Verified  Payor: Jaquan Bush / Plan: [place]  CAPITATED PT / Product Type: Commerical /    In time: 6:00   Out time: 7:00  Total Treatment Time (min): 60  Visit #: 3 of 8    Treatment Area: Low back pain [M54.5]  Spondylosis without myelopathy or radiculopathy, lumbosacral region [M47.817]  Radiculopathy, lumbar region [M54.16]    SUBJECTIVE  Pain Level (0-10 scale):  3/10  Any medication changes, allergies to medications, adverse drug reactions, diagnosis change, or new procedure performed?: [x] No    [] Yes (see summary sheet for update)  Subjective functional status/changes:   [] No changes reported  Pt reports continued B hip pain that limits her standing and walking. She states her sitting tolerance is 30 minutes. She feels some improvement in her back overall since starting therapy but is now concerned about the sides of her hips and groin pain. She still gets numbness down her legs with prolonged walking to the front of her thighs. She tries to work on her posture but has always tried to stand up straight and slightly leans back due to her chest size.     OBJECTIVE    30 min Therapeutic Exercise:  [x] See flow sheet :   Rationale: increase ROM and increase strength to improve the patients ability to increase ease of ADLs    15 min Therapeutic Activity:  [x]  See flow sheet : posture education using mirror   Rationale: increase strength, improve coordination and improve balance  to improve the patients ability to increase ease of ambulation      15 min Neuromuscular Re-education:  [x]  See flow sheet :core activation   Rationale: increase strength, improve coordination and improve balance  to improve the patients ability to increase ease of ambulation           With   [x] TE   [] TA   [] neuro   [] other: Patient Education: [x] Review HEP    [] Progressed/Changed HEP based on:   [] positioning   [] body mechanics   [] transfers   [] heat/ice application    [] other:      Other Objective/Functional Measures: FOTO: 40  Performing usual work activities: quite a bit of difficulty  Ambulation tolerance: 5 minutes  Right hip flexion MMT: 4/5 but groin pain performing  Educated on standing ITB stretch  Educated on core activation to reduce rib flare  Educated on open books for t/s mobility  Discussed talking with MD regarding hip pain  Educated on ice to greater trochanters    Pain Level (0-10 scale) post treatment:      ASSESSMENT/Changes in Function: See Progress Note. Patient will continue to benefit from skilled PT services to modify and progress therapeutic interventions, address functional mobility deficits, address ROM deficits, address strength deficits, analyze and address soft tissue restrictions, analyze and cue movement patterns and analyze and modify body mechanics/ergonomics to attain remaining goals. Progress towards goals / Updated goals:  1. Patient will improve FOTO score by 14 points in order to demonstrate a significant improvement in function. 5 minutes  2. Patient will report moderate difficulty for performing her usual work activities in order to improve quality of life. Quite a bit of difficulty  3. Patient will improve ambulation tolerance to 10 minutes in order to establish a walking program for improved pain management. Continues to have decreased ambulation tolerance (9/24/20) 5 minutes  4. Patient will improve right hip flexion MMT to 4/5 in order to increase ease of ADLs.   Met but groin pain    PLAN  [x]  Upgrade activities as tolerated     [x]  Continue plan of care  []  Update interventions per flow sheet       []  Discharge due to:_  []  Other:_      Jory Cross, PTA 10/20/2020  8:10 AM    Future Appointments   Date Time Provider Ronda Joseph   10/20/2020  6:00 PM [facility]   10/23/2020  2:30 PM Magaly Crane MD LEONEL BS AMB 10/27/2020  6:00 PM Dee Villar PTA [facility]

## 2020-10-21 NOTE — PROGRESS NOTES
[facility] Silviano Sera  [address] [facility] ([place], [place])  [phone] fax    Physical Therapy Progress Note  Patient name: Bishop Hammond Start of Care: 2020   Referral source: Fouzia Hardy MD : 1960   Medical/Treatment Diagnosis: Low back pain [M54.5]  Spondylosis without myelopathy or radiculopathy, lumbosacral region [M47.817]  Radiculopathy, lumbar region [M54.16]  Payor: [employer]  / Plan: 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 Patient will improve FOTO score by 14 points in order to demonstrate a significant improvement in function. 2. Patient will report a little bit of difficulty for performing her usual work activities in order to improve quality of life. 3. Patient will improve ambulation tolerance to 10 minutes in order to establish a walking program for improved pain management. 4. Patient will report average back and hip pain at <3/10 to improve ease of ambulation and sitting for work and performing ADLs.     ASSESSMENT/RECOMMENDATIONS: Mrs. Chantal Sung had a gap in therapy x 1 month due to changing insurance providers and awaiting authorization. She has been compliant with therapy recommendations and exercises. She has improved posture awareness but continues to be challenged with reducing t/s extension with hinging at the L3-L4 region to compensate for corrective posture due to her chest size and decreased t/s mobility. Her ambulation is limited by back pain, numbness in B LEs along the L3-4 distribution and B hip pain laterally within 5 minutes. Her sitting tolerance has improved to 30 minutes. She is tender to palpation along B greater trochanters and has pain along the groin with hip flexion right>left. She has decreased gluteus medius strength and has compensated for back pain in standing with increased shifting of weight between bilateral LEs. She will benefit from continued work on core strengthening to improve posture and also hip strengthening for stability with ambulation and transfers to reduce pain. Suggested that the pt ask the MD regarding B hip pain for evaluation as this was not previously mentioned to her MD in order to properly treat and suggest recommendations for pain reduction.      [x]Continue therapy per initial plan/protocol at a frequency of  2 x per week for 4 weeks  []Continue therapy with the following recommended changes:_____________________      _____________________________________________________________________  []Discontinue therapy progressing towards or have reached established goals  []Discontinue therapy due to lack of appreciable progress towards goals  []Discontinue therapy due to lack of attendance or compliance  []Await Physician's recommendations/decisions regarding therapy  []Other:________________________________________________________________    Thank you for this referral.   Carlyle Carpenter, [place] 10/21/2020 8:38 AM    NOTE TO PHYSICIAN:  Mindy Rosario 172   [facility] TO [facility]: [phone]  If you are unable to process this request in 24 hours please contact our office: [phone]    ? I have read the above report and request that my patient continue as recommended. ? I have read the above report and request that my patient continue therapy with the following changes/special instructions:____________________________________  ? I have read the above report and request that my patient be discharged from therapy.     Physicians signature: ______________________________Date: ______Time:______

## 2020-10-22 NOTE — PROGRESS NOTES
[facility]  [address], [place]   Phone: [phone]  Fax: [phone]        PROGRESS NOTE      HISTORY OF PRESENT ILLNESS:  The patient is a 62 y.o. female and was seen today for follow up of progressive low back pain radiating into the BLE (RLE>LLE) anterolaterally to the knee x 5 years without trauma. Her pain is exacerbated by prolonged positions. Her pain disrupts her sleep. Pt was previously on Neurontin 300 mg TID for back pain. She recalls tolerating the medication with benefit. She could not tolerate a higher dose of NEURONTIN. Pt was previously on Lyrica 75 mg BID. She recalls tolerating the medication. Pt reports intolerance to PREDNISONE, secondary to drowsiness. She was previously seen by [address] [place] [address] for the same pain complaints. Pt had previous chiropractic care. She is currently enrolled in PT. Patient denies previous spinal surgery or injections. Pt denies change in bowel or bladder habits. Pt denies fever, weight loss, or skin changes. Pt is a smoker. PmHx of obesity, DM, depression. The patient has a history of DM and reports blood sugars are well controlled, consistently remaining below 200. Her DM is followed by her PCP, Daxa Ness MD. Note from [place], [place] dated 6/24/2020 indicating patient was seen with c/o low back pain with right sided sciatica. Treated with Lyrica. Note from Dr. Martín North dated 1/24/2020 indicating patient was seen with c/o pain in the lower lumbar region involving the BLE (R>L) to the knees. Her pain was 5/10. Switched her from Neurontin 300 mg TID to Lyrica. Note from Sanchez Sarmiento NP dated 1/30/2020 indicating patient was seen with c/o right sided de quervain tenosynovitis. L spine XR dated 11/8/2019 films not independently reviewed. Per report, no acute findings. I independently reviewed the films and noted: left iliac crest seems to be slightly higher level than the right.  At her last clinical appointment, I increased her Lyrica from 75 mg BID to 150 mg BID. She continued with PT as prescribed. The patient returns today with bilateral buttocks pain radiating into the BLE (RLE>LLE) to the knee in the LLE and the foot in the RLE. She rates her pain 5/10, previously 4-8/10. She is tolerating the increased dose of Lyrica 150 mg BID with benefit. She completed her first round of PT with improvement in her lower back pain, but had exacerbation of her hip and buttocks pain. She is currently in her second round of PT. The patient has a history of DM and reports blood sugars are well controlled, consistently remaining below 200. Therapy notes dated 10/20/2020 reviewed and indicated her pain was 3/10.  reviewed. Body mass index is 38.77 kg/m².     PCP: Nora Del Valle MD      Past Medical History:   Diagnosis Date    Arthritis     Depression     Diabetes ([facility] [address]     Hypertension     Menopause     Sciatica         Social History     Socioeconomic History    Marital status:      Spouse name: Not on file    Number of children: Not on file    Years of education: Not on file    Highest education level: Not on file   Occupational History    Not on file   Social Needs    Financial resource strain: Not on file    Food insecurity     Worry: Not on file     Inability: Not on file    Transportation needs     Medical: Not on file     Non-medical: Not on file   Tobacco Use    Smoking status: Current Every Day Smoker     Years: 30.00    Smokeless tobacco: Never Used    Tobacco comment: vapor   Substance and Sexual Activity    Alcohol use: No    Drug use: No    Sexual activity: Yes     Partners: Female   Lifestyle    Physical activity     Days per week: Not on file     Minutes per session: Not on file    Stress: Not on file   Relationships    Social connections     Talks on phone: Not on file     Gets together: Not on file     Attends Tenriism service: Not on file Active member of club or organization: Not on file     Attends meetings of clubs or organizations: Not on file     Relationship status: Not on file    Intimate partner violence     Fear of current or ex partner: Not on file     Emotionally abused: Not on file     Physically abused: Not on file     Forced sexual activity: Not on file   Other Topics Concern    Not on file   Social History Narrative    Not on file       Current Outpatient Medications   Medication Sig Dispense Refill    pregabalin (Lyrica) 150 mg capsule Take 1 Cap by mouth two (2) times a day. Max Daily Amount: 300 mg. 60 Cap 1    cyclobenzaprine (FLEXERIL) 5 mg tablet Take 1 Tab by mouth two (2) times daily as needed for Muscle Spasm(s). 30 Tab 0    buPROPion SR (Wellbutrin SR) 150 mg SR tablet 1 tablet in the morning      SITagliptin-metFORMIN (JANUMET) 50-1,000 mg per tablet Take 1 Tab by mouth two (2) times daily (with meals).  amLODIPine (NORVASC) 5 mg tablet Take 5 mg by mouth daily.  atorvastatin (LIPITOR) 10 mg tablet Take  by mouth daily.  losartan 50 mg tab 100 mg, hydroCHLOROthiazide 12.5 mg cap 12.5 mg Take  by mouth daily. States using dosage 160/25mg      methocarbamol (ROBAXIN-750) 750 mg tablet Take 1 Tab by mouth four (4) times daily. 20 Tab 0    IBUPROFEN (MOTRIN PO) Take  by mouth.  pregabalin (Lyrica) 75 mg capsule Take 1 Cap by mouth two (2) times a day. Max Daily Amount: 150 mg. (Patient not taking: Reported on 10/23/2020) 60 Cap 1    acetaminophen (TYLENOL ARTHRITIS PAIN) 650 mg TbER Take 650 mg by mouth every eight (8) hours.  metFORMIN (GLUCOPHAGE) 1,000 mg tablet Take 1,000 mg by mouth two (2) times daily (with meals).  glipiZIDE (GLUCOTROL) 5 mg tablet Take  by mouth two (2) times a day.  gabapentin (NEURONTIN) 300 mg capsule Take 300 mg by mouth three (3) times daily.          Allergies   Allergen Reactions    Demerol [Meperidine] Anxiety          PHYSICAL EXAMINATION    Visit Vitals  BP (!) 156/94 (BP 1 Location: Left arm, BP Patient Position: Sitting)   Pulse 89   Temp 97 °F (36.1 °C) (Temporal)   Wt 240 lb 3.2 oz (109 kg)   LMP 08/01/2007   SpO2 97%   BMI 38.77 kg/m²       CONSTITUTIONAL: NAD, A&O x 3  SENSATION: Decreased sensation to light touch on the RLE in a L4 and L5 distribution. Otherwise, intact to light touch throughout  RANGE OF MOTION: The patient has full passive range of motion in all four extremities. MOTOR:  Straight Leg Raise: Negative, bilateral                 Hip Flex Knee Ext Knee Flex Ankle DF GTE Ankle PF Tone   Right +4/5 +4/5 +4/5 +4/5 +4/5 +4/5 +4/5   Left +4/5 +4/5 +4/5 +4/5 +4/5 +4/5 +4/5       ASSESSMENT   Diagnoses and all orders for this visit:    1. Lumbar neuritis    2. Lumbosacral spondylosis without myelopathy    3. DDD (degenerative disc disease), lumbar      IMPRESSION AND PLAN:  Patient returns to the office today with c/o bilateral buttocks pain radiating into the BLE (RLE>LLE) to the knee in the LLE and the foot in the RLE. Multiple treatment options were discussed. Pt is interested in blocks. I will order an open L spine MRI. I advised patient to bring copies of films to next visit. Pt did not feel that she would benefit from additional PT. She should d/c formal therapy and perform her HEP daily. I increased her Lyrica from 150 mg BID to 225 mg BID. Patient advised to call the office if intolerant to higher dose. Patient is neurologically intact. I will see the patient back in 1 month's time or earlier if needed. Written by Clarissa Brush, as dictated by Tomi Nguyen MD  I examined the patient, reviewed and agree with the note.

## 2020-10-23 ENCOUNTER — OFFICE VISIT (OUTPATIENT)
Dept: ORTHOPEDIC SURGERY | Age: 60
End: 2020-10-23
Payer: COMMERCIAL

## 2020-10-23 VITALS
DIASTOLIC BLOOD PRESSURE: 94 MMHG | WEIGHT: 240.2 LBS | BODY MASS INDEX: 38.77 KG/M2 | TEMPERATURE: 97 F | HEART RATE: 89 BPM | OXYGEN SATURATION: 97 % | SYSTOLIC BLOOD PRESSURE: 156 MMHG

## 2020-10-23 DIAGNOSIS — M54.16 LUMBAR NEURITIS: Primary | ICD-10-CM

## 2020-10-23 DIAGNOSIS — M51.36 DDD (DEGENERATIVE DISC DISEASE), LUMBAR: ICD-10-CM

## 2020-10-23 DIAGNOSIS — M47.817 LUMBOSACRAL SPONDYLOSIS WITHOUT MYELOPATHY: ICD-10-CM

## 2020-10-23 PROCEDURE — 99214 OFFICE O/P EST MOD 30 MIN: CPT | Performed by: PHYSICAL MEDICINE & REHABILITATION

## 2020-10-23 RX ORDER — PREGABALIN 225 MG/1
225 CAPSULE ORAL 2 TIMES DAILY
Qty: 60 CAP | Refills: 1 | Status: SHIPPED | OUTPATIENT
Start: 2020-10-23

## 2020-10-23 NOTE — LETTER
10/23/20 Patient: Joel Mg YOB: 1960 Date of Visit: 10/23/2020 241 Dimitrios Edward MD 
[address] [place] [address] [address] [address] VIA Facsimile: [phone] Dear 241 Dimitrios Edward MD, Thank you for referring Ms. Lenora Castaneda to [address] for evaluation. My notes for this consultation are attached. If you have questions, please do not hesitate to call me. I look forward to following your patient along with you. Sincerely, Julio Cesar Dunham MD

## 2020-10-27 ENCOUNTER — HOSPITAL ENCOUNTER (OUTPATIENT)
Dept: PHYSICAL THERAPY | Age: 60
Discharge: HOME | End: 2020-10-27
Payer: COMMERCIAL

## 2020-10-27 PROCEDURE — 97110 THERAPEUTIC EXERCISES: CPT

## 2020-10-27 PROCEDURE — 97112 NEUROMUSCULAR REEDUCATION: CPT

## 2020-10-27 PROCEDURE — 97530 THERAPEUTIC ACTIVITIES: CPT

## 2020-10-27 NOTE — PROGRESS NOTES
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    Visits from Start of Care: 13    Missed Visits: 0    Reporting Period : 10/20/2020 to 10/27/2020    Date:10/27/2020  : 1960  [x]  Patient  Verified  Payor: OPTIMA / [place]: [place] [employer]  CAPITATED PT / Product Type: Commerical /    In time: 6:00  Out time:6:41  Total Treatment Time (min): 41  Visit #: 4 of 8    SUBJECTIVE  Pain Level (0-10 scale): 3/10  Any medication changes, allergies to medications, adverse drug reactions, diagnosis change, or new procedure performed?: [x] No    [] Yes (see summary sheet for update)  Subjective functional status/changes:   [] No changes reported  Pt reports today was a better day in regards to her back and hips. She was able to stand longer without increased pain. She followed up with the MD and they agreed on a MRI for her back and increasing her lyrica. She states the MD wants to see if the nerve pain is coming from her back and hips; they may even do a EMG to see if any of the pain is neuropathy. She is going to D/C today and work on core strengthening at home. She feels overall her back pain has improved; she isn't sure if something is still wrong with her hips too.      OBJECTIVE    16 min Therapeutic Exercise:  [x] See flow sheet :   Rationale: increase ROM, increase strength, improve coordination, improve balance and increase proprioception to improve the patients ability to improve ease of ambulation with decreased pain    15 min Therapeutic Activity:  [x]  See flow sheet : posture education against wall to decrease l/s lordosis and improve core activation   Rationale: increase ROM and increase strength  to improve the patients ability to decrease pain with ambulation and improve posture     10 min Neuromuscular Re-education:  [x]  See flow sheet : SB core exercises   Rationale: increase ROM and increase strength  to improve the patients ability to improve core strength/endurance for decreased pain with ambulation          With   [] TE   [] TA   [] neuro   [] other: Patient Education: [x] Review HEP    [] Progressed/Changed HEP based on:   [] positioning   [] body mechanics   [] transfers   [] heat/ice application    [] other:      Other Objective/Functional Measures:   Improved form with PPT in supine, sitting on SB and against wall  Pt educated on increasing core activation in sitting/standing to help with l/s support  Pt is working on diet to lose 10 pounds she gained from working from home the last 3 months  She is going to get SB to use at home for core strengthening as taught in therapy  She has no further questions regarding l/s or hips at this time  Educated on PPT with glute squeeze as a bridge was slightly irritating to latera hips     Pain Level (0-10 scale) post treatment: 2/10    Summary of Care:  Goal: Patient will improve FOTO score by 14 points in order to demonstrate a significant improvement in function. Status at last note/certification: 40  Status at discharge: not met    Goal: Patient will report a little bit of difficulty for performing her usual work activities in order to improve quality of life.    Status at last note/certification: quite a bit of difficulty  Status at discharge: not met    Goal: Patient will improve ambulation tolerance to 10 minutes in order to establish a walking program for improved pain management. Status at last note/certification: 5 minutes  Status at discharge: not met    Goal: Patient will report average back and hip pain at <3/10 to improve ease of ambulation and sitting for work and performing ADLs. Status at last note/certification: 4-4/32  Status at discharge: not met    ASSESSMENT/Changes in Function: Per progress note on 10/20/2020, Mrs. Rossy Vicente had a gap in therapy x 1 month due to changing insurance providers and awaiting authorization. She has been compliant with therapy recommendations and exercises. She has improved posture awareness but continues to be challenged with reducing t/s extension with hinging at the L3-L4 region to compensate for corrective posture due to her chest size and decreased t/s mobility. Her ambulation is limited by back pain, numbness in B LEs along the L3-4 distribution and B hip pain laterally within 5 minutes. Her sitting tolerance has improved to 30 minutes. She is tender to palpation along B greater trochanters and has pain along the groin with hip flexion right>left. She has decreased gluteus medius strength and has compensated for back pain in standing with increased shifting of weight between bilateral LEs. Per her follow up with the MD, they are going to proceed with an increased Lyrica dosage, MRI of the l/s and continuation of a core strengthening program at home. She will be discharged from physical therapy at this time.      Thank you for this referral!      PLAN  [x]Discontinue therapy: [x]Patient has reached or is progressing toward set goals      []Patient is non-compliant or has abdicated      []Due to lack of appreciable progress towards set Rober 296, PTA 10/27/2020  2:37 PM

## 2020-11-04 ENCOUNTER — TELEPHONE (OUTPATIENT)
Dept: ORTHOPEDIC SURGERY | Age: 60
End: 2020-11-04

## 2020-11-10 NOTE — TELEPHONE ENCOUNTER
[facility] was [facility]. \"Authorization requests for the member are not processed by [facility]. These requests should be submitted to [employer]. The current request has been forwarded to [employer]. Phone #[phone], Fax # [phone]. \"

## 2020-11-23 DIAGNOSIS — M54.16 LUMBAR NEURITIS: ICD-10-CM

## 2020-11-23 DIAGNOSIS — M51.36 DDD (DEGENERATIVE DISC DISEASE), LUMBAR: ICD-10-CM

## 2020-11-23 DIAGNOSIS — M47.817 LUMBOSACRAL SPONDYLOSIS WITHOUT MYELOPATHY: ICD-10-CM

## 2020-12-14 ENCOUNTER — OFFICE VISIT (OUTPATIENT)
Dept: ORTHOPEDIC SURGERY | Age: 60
End: 2020-12-14
Payer: COMMERCIAL

## 2020-12-14 VITALS
HEIGHT: 66 IN | WEIGHT: 239.2 LBS | DIASTOLIC BLOOD PRESSURE: 85 MMHG | HEART RATE: 91 BPM | TEMPERATURE: 96.4 F | OXYGEN SATURATION: 97 % | RESPIRATION RATE: 16 BRPM | BODY MASS INDEX: 38.44 KG/M2 | SYSTOLIC BLOOD PRESSURE: 138 MMHG

## 2020-12-14 DIAGNOSIS — M25.571 CHRONIC PAIN OF RIGHT ANKLE: ICD-10-CM

## 2020-12-14 DIAGNOSIS — M77.31 HEEL SPUR, RIGHT: ICD-10-CM

## 2020-12-14 DIAGNOSIS — G89.29 CHRONIC PAIN OF RIGHT ANKLE: ICD-10-CM

## 2020-12-14 DIAGNOSIS — M76.61 ACHILLES TENDINITIS OF RIGHT LOWER EXTREMITY: Primary | ICD-10-CM

## 2020-12-14 PROCEDURE — 99203 OFFICE O/P NEW LOW 30 MIN: CPT | Performed by: ORTHOPAEDIC SURGERY

## 2020-12-14 PROCEDURE — 73610 X-RAY EXAM OF ANKLE: CPT | Performed by: ORTHOPAEDIC SURGERY

## 2020-12-14 RX ORDER — MELOXICAM 15 MG/1
15 TABLET ORAL
Qty: 30 TAB | Refills: 0 | Status: SHIPPED | OUTPATIENT
Start: 2020-12-14

## 2020-12-14 RX ORDER — BUPROPION HYDROCHLORIDE 300 MG/1
1 TABLET ORAL
COMMUNITY
Start: 2020-11-30

## 2020-12-14 RX ORDER — FAMOTIDINE 40 MG/1
40 TABLET, FILM COATED ORAL DAILY
Qty: 30 TAB | Refills: 0 | Status: SHIPPED | OUTPATIENT
Start: 2020-12-14

## 2020-12-14 NOTE — PROGRESS NOTES
AMBULATORY PROGRESS NOTE      Patient: Pato Romano             MRN: [id number]     SSN: [id number] Body mass index is 38.61 kg/m². YOB: 1960     AGE: 62 y.o. EX: female    PCP: Africa Post MD       IMPRESSION //  DIAGNOSIS AND TREATMENT PLAN      DIAGNOSES  1. Achilles tendinitis of right lower extremity    2. Chronic pain of right ankle    3. Heel spur, right        Orders Placed This Encounter    AMB POC XRAY, ANKLE; COMPLETE, 3+ VIE     Order Specific Question:   Reason for Exam     Answer:   PAIN    buPROPion XL (Wellbutrin XL) 300 mg XL tablet     Sig: Take 1 Tab by mouth.  meloxicam (Mobic) 15 mg tablet     Sig: Take 1 Tab by mouth daily (with breakfast). Dispense:  30 Tab     Refill:  0    famotidine (PEPCID) 40 mg tablet     Sig: Take 1 Tab by mouth daily. I am prescribing this medication for protective measures while taking the NSAID. Dispense:  30 Tab     Refill:  0        She is Achilles calcific insertional tendinitis, recommendations unless as below. PLAN:    1. Mobic 15 m PO every day; 30 tablets, 0 refills. 2. Pepcid 40 m PO every day; 30 tablets, 0 refills. 3. Encouraged gentle runner's stretch    RTO- 1 month      HPI //  [address] [facility] Jimmy A 62 y.o. female who presents to my outpatient office for evaluation of: right foot pain. She reports spontaneous onset of pain in her right heel due to a bothersome knot. She describes haing pain along the central insertional Achilles especially with prolonged walking and going up/down steps. She finds that one of her tennis shoes give her relief when she walks. She has pain with this tennis shoe pressing against the back of her heel. The patient denies any GI issues or bariatric procedures. Patient denies being on any blood thinners. The patient denies any kidney, lung, or heart diseases. Pt works at the [employer].      Visit Vitals  /85 (BP 1 Location: Right arm, BP Patient Position: Sitting)   Pulse 91   Temp (!) 96.4 °F (35.8 °C) (Temporal)   Resp 16   Ht 5' 6\" (1.676 m)   Wt 239 lb 3.2 oz (108.5 kg)   SpO2 97%   BMI 38.61 kg/m²       ANKLE/FOOT right    Psychiatry: Alert, oriented x 3 (name,place,time of day); speech normal in context and clarity, memory intact grossly, no involuntary movements - tremors, no dementia  Gait: slight limp  Tenderness: mild distal insertional central Achilles tendon. Cutaneous: small bump to central Achilles insertional point  Joint Motion: WNL  Joint / Tendon Stability: No Ankle or Subtalar instability or joint laxity. No peroneal sublux ability or dislocation  Alignment: neutral Hindfoot, none Metatarsus Adductus Metatarsus. Neuro Motor/Sensory: NL/NL,  Vascular: NL foot/ankle pulses,   Lymphatics: No extremity lymphedema, No calf swelling, no tenderness to calf muscles. CHART REVIEW     Patient Active Problem List   Diagnosis Code    Sepsis, unspecified ([address] A41.9    Axillary abscess L02.419    Morbid obesity ([address] E66.01    Sciatica M54.30    Back pain M54.9    Hypokalemia E87.6    Dyslipidemia E78.5    Cervical pain M54.2    Axillary mass R22.30        Elvis Bishnu has been experiencing pain and discomfort confirmed as outlined in the pain assessment outlined below. Pain Assessment  12/14/2020   Location of Pain Foot   Location Modifiers Right   Severity of Pain 7   Quality of Pain Aching;Dull   Quality of Pain Comment -   Duration of Pain Persistent   Frequency of Pain Constant   Aggravating Factors Standing   Aggravating Factors Comment sitting, driving   Limiting Behavior No   Relieving Factors Elevation   Relieving Factors Comment -   Result of Injury No        Lluvia A Branch  has a past medical history of Arthritis, Depression, Diabetes ([address] Hypertension, Menopause, and Sciatica.  She also has no past medical history of Nipple discharge. Patients is employed at:         Past Medical History:   Diagnosis Date    Arthritis     Depression     Diabetes ([address]     Hypertension     Menopause     Sciatica      Past Surgical History:   Procedure Laterality Date    FOOT/TOES SURGERY PROC UNLISTED      HX GYN      hysterectomy    HX HYSTERECTOMY      HX ORTHOPAEDIC      left foot x's 3     Current Outpatient Medications   Medication Sig    buPROPion XL (Wellbutrin XL) 300 mg XL tablet Take 1 Tab by mouth.  meloxicam (Mobic) 15 mg tablet Take 1 Tab by mouth daily (with breakfast).  famotidine (PEPCID) 40 mg tablet Take 1 Tab by mouth daily. I am prescribing this medication for protective measures while taking the NSAID.  pregabalin (LYRICA) 225 mg capsule Take 1 Cap by mouth two (2) times a day. Max Daily Amount: 450 mg.  pregabalin (Lyrica) 150 mg capsule Take 1 Cap by mouth two (2) times a day. Max Daily Amount: 300 mg.  cyclobenzaprine (FLEXERIL) 5 mg tablet Take 1 Tab by mouth two (2) times daily as needed for Muscle Spasm(s).  buPROPion SR (Wellbutrin SR) 150 mg SR tablet 1 tablet in the morning    SITagliptin-metFORMIN (JANUMET) 50-1,000 mg per tablet Take 1 Tab by mouth two (2) times daily (with meals).  amLODIPine (NORVASC) 5 mg tablet Take 5 mg by mouth daily.  atorvastatin (LIPITOR) 10 mg tablet Take  by mouth daily.  metFORMIN (GLUCOPHAGE) 1,000 mg tablet Take 1,000 mg by mouth two (2) times daily (with meals).  glipiZIDE (GLUCOTROL) 5 mg tablet Take  by mouth two (2) times a day.  losartan 50 mg tab 100 mg, hydroCHLOROthiazide 12.5 mg cap 12.5 mg Take  by mouth daily. States using dosage 160/25mg    methocarbamol (ROBAXIN-750) 750 mg tablet Take 1 Tab by mouth four (4) times daily.  IBUPROFEN (MOTRIN PO) Take  by mouth.  pregabalin (Lyrica) 75 mg capsule Take 1 Cap by mouth two (2) times a day. Max Daily Amount: 150 mg.  (Patient not taking: Reported on 10/23/2020)    acetaminophen (TYLENOL ARTHRITIS PAIN) 650 mg TbER Take 650 mg by mouth every eight (8) hours.  gabapentin (NEURONTIN) 300 mg capsule Take 300 mg by mouth three (3) times daily. No current facility-administered medications for this visit. Allergies   Allergen Reactions    Demerol [Meperidine] Anxiety     Social History     Occupational History    Not on file   Tobacco Use    Smoking status: Current Every Day Smoker     Years: 30.00    Smokeless tobacco: Never Used    Tobacco comment: vapor   Substance and Sexual Activity    Alcohol use: No    Drug use: No    Sexual activity: Yes     Partners: Female     Family History   Problem Relation Age of Onset    Diabetes Mother     Heart Disease Mother     Heart Disease Sister     Heart Disease Sister        THE  FOR Liliana Parry MD 12/14/2020 . DIAGNOSTIC IMAGING  LAB DATA      No results found for: HBA1C, HGBE8, CQZ1CTVF, ZHE3FQZI //   Lab Results   Component Value Date/Time    Glucose 118 (H) 09/28/2014 10:20 PM        No results found for: JQC8ZVLV, TGG9SETG      Lab Results   Component Value Date/Time    Vitamin D 25-Hydroxy 32 08/25/2010 11:05 AM         REVIEW OF SYSTEMS : 12/14/2020  ALL BELOW ARE Negative except : SEE HPI     CONSTITUTIONAL: No weight loss  PSYCHOLOGICAL : No Feelings of anxiety, depression, agitation  EYES: No blurred vision and no eye discharge. NO eye pain, double vision  ENT: No nasal discharge. No ear pain. CARDIOVASCULAR: No chest pain and no diaphoresis. RESPIRATORY: No cough, no hemoptysis. GI: No vomiting, no diarrhea   : No urinary frequency and no dysuria. MUSCULOSKELETAL: see HPI  SKIN: No rashes. NEURO:  No dizziness,weakness, headaches// No visual changes or confusion, or seizures,   ENDOCRINE: No polyphagia and no polydipsia. HEMATOLOGY: No bleeding tendencies.       DIAGNOSTIC IMAGING        ANKLE X RAYS 3 VIEWS RIGHT  X RAYS AT [facility]  12/14/2020    FINDINGS:     SOFT TISSUES:              Absent soft tissue swelling, No soft tissue calcifications, No Calcified blood vessels     Soft tissue swelling location:    None to fibula region        None medial aspect    None dorsal midfoot/forefoot  No radiopaque foreign body, abnormal lucency, or focal swelling noted within soft tissues. OSSEOUS:     No fractures, subluxations, dislocations   Bone spur to inferior aspect of calcaneus is  moderate   Bone spur to superior calcaneus region is           small SIZED CALCIFIC INSERTIONAL BONE SPUR IS PRESENT   Mineralization: suggests no Osteopenia or no Osteoporosis    ALIGNMENT:    Ankle mortise alignment is congruent. Tibial plafond and talar dome intact      No Osteochondral defects seen    No Ankle joint effusion seen         I have personally reviewed these images of the above study. The interpretation of this study is my professional opinion.     Emi Gibbons MD  12/14/2020  4:23 PM

## 2021-01-13 ENCOUNTER — OFFICE VISIT (OUTPATIENT)
Dept: ORTHOPEDIC SURGERY | Age: 61
End: 2021-01-13
Payer: COMMERCIAL

## 2021-01-13 VITALS
BODY MASS INDEX: 38.92 KG/M2 | SYSTOLIC BLOOD PRESSURE: 131 MMHG | HEART RATE: 96 BPM | RESPIRATION RATE: 16 BRPM | HEIGHT: 66 IN | DIASTOLIC BLOOD PRESSURE: 74 MMHG | WEIGHT: 242.2 LBS | TEMPERATURE: 97.1 F | OXYGEN SATURATION: 96 %

## 2021-01-13 DIAGNOSIS — M76.61 ACHILLES TENDINITIS OF RIGHT LOWER EXTREMITY: Primary | ICD-10-CM

## 2021-01-13 PROCEDURE — 99213 OFFICE O/P EST LOW 20 MIN: CPT | Performed by: ORTHOPAEDIC SURGERY

## 2021-01-13 NOTE — PROGRESS NOTES
AMBULATORY PROGRESS NOTE      Patient: Dorothy Acosta             MRN: [id number]     SSN: [id number] Body mass index is 39.09 kg/m². YOB: 1960     AGE: 62 y.o. EX: female    PCP: Alverta Skiff, NP       IMPRESSION //  DIAGNOSIS AND TREATMENT PLAN      DIAGNOSES  1. Achilles tendinitis of right lower extremity        No orders of the defined types were placed in this encounter. PLAN:    1. Referral to Dr. Garrett Wilson for right wrist pain, assess mobile ganglion cyst    RTO- from a foot and ankle standpoint will see patient as needed. HPI //  [address] [facility] Deweese A 62 y.o. female who presents to my outpatient office for  Follow up evaluation of: right foot pain. A the last OV, Rx for mobic/pepcid and encouraged gentle runner's stretch. Since the last OV, Dorothy Acosta reports no major pain or soreness in her right foot. Occasionally she will get some mild soreness. Patient also mentions mild R wrist pain. On examination, mobile ganglion cyst. She states that she received three cortisone injections in her wrist and has worn a wrist guard. Pt works at the [employer]. Visit Vitals  /74 (BP 1 Location: Right arm, BP Patient Position: Sitting)   Pulse 96   Temp 97.1 °F (36.2 °C) (Temporal)   Resp 16   Ht 5' 6\" (1.676 m)   Wt 242 lb 3.2 oz (109.9 kg)   SpO2 96%   BMI 39.09 kg/m²       ANKLE/FOOT right    Psychiatry: Alert, oriented x 3 (name,place,time of day); speech normal in context and clarity, memory intact grossly, no involuntary movements - tremors, no dementia  Gait: normal  Tenderness: mild distal insertional central Achilles tendon. Cutaneous: small bump to central Achilles insertional point  Joint Motion: WNL  Joint / Tendon Stability: No Ankle or Subtalar instability or joint laxity.                        No peroneal sublux ability or dislocation  Alignment: neutral Hindfoot, none Metatarsus Adductus Metatarsus. Neuro Motor/Sensory: NL/NL,  Vascular: NL foot/ankle pulses,   Lymphatics: No extremity lymphedema, No calf swelling, no tenderness to calf muscles. CHART REVIEW     Patient Active Problem List   Diagnosis Code    Sepsis, unspecified ([facility] A41.9    Axillary abscess L02.419    Morbid obesity ([facility] E66.01    Sciatica M54.30    Back pain M54.9    Hypokalemia E87.6    Dyslipidemia E78.5    Cervical pain M54.2    Axillary mass R22.30        Marissa Parish has been experiencing pain and discomfort confirmed as outlined in the pain assessment outlined below. Pain Assessment  1/13/2021   Location of Pain Foot   Location Modifiers Right   Severity of Pain 0   Quality of Pain -   Quality of Pain Comment -   Duration of Pain -   Frequency of Pain -   Aggravating Factors -   Aggravating Factors Comment -   Limiting Behavior -   Relieving Factors -   Relieving Factors Comment -   Result of Injury -        Jose JAMES Boswell  has a past medical history of Arthritis, Depression, Diabetes ([facility] [address] Hypertension, Menopause, and Sciatica. She also has no past medical history of Nipple discharge. Patients is employed at:         Past Medical History:   Diagnosis Date    Arthritis     Depression     Diabetes ([facility] [address]     Hypertension     Menopause     Sciatica      Past Surgical History:   Procedure Laterality Date    FOOT/TOES SURGERY PROC UNLISTED      HX GYN      hysterectomy    HX HYSTERECTOMY      HX ORTHOPAEDIC      left foot x's 3     Current Outpatient Medications   Medication Sig    buPROPion XL (Wellbutrin XL) 300 mg XL tablet Take 1 Tab by mouth.  meloxicam (Mobic) 15 mg tablet Take 1 Tab by mouth daily (with breakfast).  famotidine (PEPCID) 40 mg tablet Take 1 Tab by mouth daily. I am prescribing this medication for protective measures while taking the NSAID.  pregabalin (LYRICA) 225 mg capsule Take 1 Cap by mouth two (2) times a day.  Max Daily Amount: 450 mg.   Napoleon Velarde pregabalin (Lyrica) 150 mg capsule Take 1 Cap by mouth two (2) times a day. Max Daily Amount: 300 mg.   • cyclobenzaprine (FLEXERIL) 5 mg tablet Take 1 Tab by mouth two (2) times daily as needed for Muscle Spasm(s).   • buPROPion SR (Wellbutrin SR) 150 mg SR tablet 1 tablet in the morning   • pregabalin (Lyrica) 75 mg capsule Take 1 Cap by mouth two (2) times a day. Max Daily Amount: 150 mg.   • SITagliptin-metFORMIN (JANUMET) 50-1,000 mg per tablet Take 1 Tab by mouth two (2) times daily (with meals).   • amLODIPine (NORVASC) 5 mg tablet Take 5 mg by mouth daily.   • atorvastatin (LIPITOR) 10 mg tablet Take  by mouth daily.   • metFORMIN (GLUCOPHAGE) 1,000 mg tablet Take 1,000 mg by mouth two (2) times daily (with meals).   • glipiZIDE (GLUCOTROL) 5 mg tablet Take  by mouth two (2) times a day.   • losartan 50 mg tab 100 mg, hydroCHLOROthiazide 12.5 mg cap 12.5 mg Take  by mouth daily. States using dosage 160/25mg   • methocarbamol (ROBAXIN-750) 750 mg tablet Take 1 Tab by mouth four (4) times daily.   • IBUPROFEN (MOTRIN PO) Take  by mouth.   • acetaminophen (TYLENOL ARTHRITIS PAIN) 650 mg TbER Take 650 mg by mouth every eight (8) hours.   • gabapentin (NEURONTIN) 300 mg capsule Take 300 mg by mouth three (3) times daily.     No current facility-administered medications for this visit.      Allergies   Allergen Reactions   • Demerol [Meperidine] Anxiety     Social History     Occupational History   • Not on file   Tobacco Use   • Smoking status: Former Smoker     Years: 30.00   • Smokeless tobacco: Never Used   • Tobacco comment: vapor   Substance and Sexual Activity   • Alcohol use: No   • Drug use: No   • Sexual activity: Yes     Partners: Female     Family History   Problem Relation Age of Onset   • Diabetes Mother    • Heart Disease Mother    • Heart Disease Sister    • Heart Disease Sister        THE  FOR Lluvia RAMIREZ Elbert  WAS REVIEWED BY Tristan Patel MD 1/13/2021 .      DIAGNOSTIC IMAGING  LAB DATA  No results found for: HBA1C, HGBE8, FMZ7EMWI, WZQ2ANVF //   Lab Results   Component Value Date/Time    Glucose 118 (H) 09/28/2014 10:20 PM        No results found for: KNT1BMCH, ZUR0VURQ      Lab Results   Component Value Date/Time    Vitamin D 25-Hydroxy 32 08/25/2010 11:05 AM         REVIEW OF SYSTEMS : 1/13/2021  ALL BELOW ARE Negative except : SEE HPI     CONSTITUTIONAL: No weight loss  PSYCHOLOGICAL : No Feelings of anxiety, depression, agitation  EYES: No blurred vision and no eye discharge. NO eye pain, double vision  ENT: No nasal discharge. No ear pain. CARDIOVASCULAR: No chest pain and no diaphoresis. RESPIRATORY: No cough, no hemoptysis. GI: No vomiting, no diarrhea   : No urinary frequency and no dysuria. MUSCULOSKELETAL: see HPI  SKIN: No rashes. NEURO:  No dizziness,weakness, headaches// No visual changes or confusion, or seizures,   ENDOCRINE: No polyphagia and no polydipsia. HEMATOLOGY: No bleeding tendencies. DIAGNOSTIC IMAGING      No notes on file    Please see above section of this report. I have personally reviewed the results of the above study. The interpretation of this study is my professional opinion. Written by Domingo Nguyen, as dictated by Dr. Tiara Frausto. I, Dr. Tiara Frausto, confirm that all documentation is accurate.

## 2021-01-25 NOTE — PROGRESS NOTES
[facility]  [address], [place]   Phone: [phone]  Fax: [phone]        PROGRESS NOTE      HISTORY OF PRESENT ILLNESS:  The patient is a 62 y.o. female and was seen today for follow up of bilateral buttocks pain radiating into the BLE (RLE>LLE) to the knee in the LLE and the foot in the RLE. Previously, she was seen for progressive low back pain radiating into the BLE (RLE>LLE) anterolaterally to the knee x 5 years without trauma. Her pain is exacerbated by prolonged positions. Her pain disrupts her sleep. Pt was previously on Neurontin 300 mg TID for back pain. She recalls tolerating the medication with benefit. She could not tolerate a higher dose of NEURONTIN. Pt failed LYRICA 225 mg BID secondary to minimal relief. Pt reports intolerance to PREDNISONE, secondary to drowsiness. She was previously seen by Saadia Tavarez for the same pain complaints. Pt had previous chiropractic care. She is currently enrolled in PT. Patient denies previous spinal surgery or injections. Pt denies change in bowel or bladder habits. Pt denies fever, weight loss, or skin changes. Pt is a previous smoker. PmHx of obesity, DM, depression. The patient has a history of DM and reports blood sugars are well controlled, consistently remaining below 200. Her DM is followed by her PCP, Erlinda Diop MD. Note from [place], [place] dated 6/24/2020 indicating patient was seen with c/o low back pain with right sided sciatica. Treated with Lyrica. Note from Dr. Jesi Cobian 9/34/7829 [facility] patient was seen with c/o pain in the lower lumbar region involving the BLE (R>L) to the knees. Her pain was 5/10. Switched her from Neurontin 300 mg TID to Lyrica. Note from Jack Dyre NP dated 1/30/2020 indicating patient was seen with c/o right sided de quervain tenosynovitis. Therapy notes dated 10/20/2020 reviewed and indicated her pain was 3/10.  L spine Fritz Clifton 11/8/2019 films not independently reviewed. Per report, no acute findings. I independently reviewed the films and noted: left iliac crest seems to be slightly higher level than the right. At her last clinical appointment, pt was interested in blocks. I ordered an open L spine MRI. Pt did not feel that she would benefit from additional PT. She discontinued formal therapy and performed her HEP daily. I increased her Lyrica from 150 mg BID to 225 mg BID. The patient returns today and reports pain location and distribution remains unchanged. She rates her pain 3-8/10, previously 5/10. She is tolerating the increased dose of Lyrica 225 mg BID without benefit. Therapy notes reviewed. pt takes Wellbutrin. Pt denies change in bowel or bladder habits. Patient denies history of glaucoma. Note from Dr. Georgette Sofia dated 1/13/2021 indicating patient has right achilles tendonitis. Referred to Dr. Trinity Baugh for right wrist pain. L spine MRI dated 1/28/2021 films independently reviewed. Per report, facet arthropathy at multiple levels. No central canal stenosis is seen. Small degenerative bulge and caudal aspect of the right L4-5 neuroforamina new from 2014 probably not clinically significant. Exam stable from 2014.  reviewed. Body mass index is 40.73 kg/m².     PCP: Saranya Catalan NP      Past Medical History:   Diagnosis Date    Arthritis     Depression     Diabetes ([facility] [address]     Hypertension     Menopause     Sciatica         Social History     Socioeconomic History    Marital status:      Spouse name: Not on file    Number of children: Not on file    Years of education: Not on file    Highest education level: Not on file   Occupational History    Not on file   Social Needs    Financial resource strain: Not on file    Food insecurity     Worry: Not on file     Inability: Not on file    Transportation needs     Medical: Not on file     Non-medical: Not on file   Tobacco Use    Smoking status: Former Smoker Years: 30.00    Smokeless tobacco: Never Used    Tobacco comment: vapor   Substance and Sexual Activity    Alcohol use: No    Drug use: No    Sexual activity: Yes     Partners: Female   Lifestyle    Physical activity     Days per week: Not on file     Minutes per session: Not on file    Stress: Not on file   Relationships    Social connections     Talks on phone: Not on file     Gets together: Not on file     Attends Worship service: Not on file     Active member of club or organization: Not on file     Attends meetings of clubs or organizations: Not on file     Relationship status: Not on file    Intimate partner violence     Fear of current or ex partner: Not on file     Emotionally abused: Not on file     Physically abused: Not on file     Forced sexual activity: Not on file   Other Topics Concern    Not on file   Social History Narrative    Not on file       Current Outpatient Medications   Medication Sig Dispense Refill    buPROPion XL (Wellbutrin XL) 300 mg XL tablet Take 1 Tab by mouth.  meloxicam (Mobic) 15 mg tablet Take 1 Tab by mouth daily (with breakfast). 30 Tab 0    famotidine (PEPCID) 40 mg tablet Take 1 Tab by mouth daily. I am prescribing this medication for protective measures while taking the NSAID. 30 Tab 0    pregabalin (Lyrica) 150 mg capsule Take 1 Cap by mouth two (2) times a day. Max Daily Amount: 300 mg. 60 Cap 1    cyclobenzaprine (FLEXERIL) 5 mg tablet Take 1 Tab by mouth two (2) times daily as needed for Muscle Spasm(s). 30 Tab 0    SITagliptin-metFORMIN (JANUMET) 50-1,000 mg per tablet Take 1 Tab by mouth two (2) times daily (with meals).  amLODIPine (NORVASC) 5 mg tablet Take 5 mg by mouth daily.  atorvastatin (LIPITOR) 10 mg tablet Take  by mouth daily.  glipiZIDE (GLUCOTROL) 5 mg tablet Take  by mouth two (2) times a day.  losartan 50 mg tab 100 mg, hydroCHLOROthiazide 12.5 mg cap 12.5 mg Take  by mouth daily.  States using dosage 160/25mg      pregabalin (LYRICA) 225 mg capsule Take 1 Cap by mouth two (2) times a day. Max Daily Amount: 450 mg. 60 Cap 1    buPROPion SR (Wellbutrin SR) 150 mg SR tablet 1 tablet in the morning      pregabalin (Lyrica) 75 mg capsule Take 1 Cap by mouth two (2) times a day. Max Daily Amount: 150 mg. 60 Cap 1    acetaminophen (TYLENOL ARTHRITIS PAIN) 650 mg TbER Take 650 mg by mouth every eight (8) hours.  metFORMIN (GLUCOPHAGE) 1,000 mg tablet Take 1,000 mg by mouth two (2) times daily (with meals).  gabapentin (NEURONTIN) 300 mg capsule Take 300 mg by mouth three (3) times daily.  methocarbamol (ROBAXIN-750) 750 mg tablet Take 1 Tab by mouth four (4) times daily. 20 Tab 0    IBUPROFEN (MOTRIN PO) Take  by mouth. Allergies   Allergen Reactions    Demerol [Meperidine] Anxiety          PHYSICAL EXAMINATION    Visit Vitals  /80 (BP 1 Location: Left upper arm)   Pulse 94   Temp 97.1 °F (36.2 °C)   Resp 18   Ht 5' 4.5\" (1.638 m)   Wt 241 lb (109.3 kg)   LMP 08/01/2007   SpO2 99%   BMI 40.73 kg/m²       CONSTITUTIONAL: NAD, A&O x 3  SENSATION: Decreased sensation to light touch on the RLE in a L4 and L5 distribution. Otherwise, intact to light touch throughout  RANGE OF MOTION: The patient has full passive range of motion in all four extremities. MOTOR:  Straight Leg Raise: Negative, bilateral                 Hip Flex Knee Ext Knee Flex Ankle DF GTE Ankle PF Tone   Right +4/5 +4/5 +4/5 +4/5 +4/5 +4/5 +4/5   Left +4/5 +4/5 +4/5 +4/5 +4/5 +4/5 +4/5       ASSESSMENT   Diagnoses and all orders for this visit:    1. Lumbar neuritis    2. Lumbosacral spondylosis without myelopathy    3. DDD (degenerative disc disease), lumbar    4. Facet arthropathy      IMPRESSION AND PLAN:  Patient returns to the office today with c/o bilateral buttocks pain radiating into the BLE (RLE>LLE) to the knee in the LLE and the foot in the RLE. Multiple treatment options were discussed.  I will wean her off of Lyrica 225 mg BID secondary to minimal benefit. I will try her on Topamax 75 mg qhs. The risks, benefits, and potential side effects of this medication were discussed. Patient understands and wishes to proceed. Patient advised to call the office if intolerant to new medication. I will order a BLE EMG. Patient is neurologically intact. I will see the patient back following the EMG or earlier if needed. Written by Connie Woodruff, as dictated by Davida Hawk MD  I examined the patient, reviewed and agree with the note.

## 2021-01-28 ENCOUNTER — HOSPITAL ENCOUNTER (OUTPATIENT)
Age: 61
Discharge: HOME | End: 2021-01-28
Attending: PHYSICAL MEDICINE & REHABILITATION
Payer: COMMERCIAL

## 2021-01-28 PROCEDURE — 72148 MRI LUMBAR SPINE W/O DYE: CPT

## 2021-02-01 ENCOUNTER — OFFICE VISIT (OUTPATIENT)
Dept: ORTHOPEDIC SURGERY | Age: 61
End: 2021-02-01
Payer: COMMERCIAL

## 2021-02-01 VITALS
RESPIRATION RATE: 18 BRPM | HEIGHT: 65 IN | WEIGHT: 241 LBS | DIASTOLIC BLOOD PRESSURE: 80 MMHG | SYSTOLIC BLOOD PRESSURE: 136 MMHG | OXYGEN SATURATION: 99 % | BODY MASS INDEX: 40.15 KG/M2 | TEMPERATURE: 97.1 F | HEART RATE: 94 BPM

## 2021-02-01 DIAGNOSIS — M47.817 LUMBOSACRAL SPONDYLOSIS WITHOUT MYELOPATHY: ICD-10-CM

## 2021-02-01 DIAGNOSIS — M54.16 LUMBAR NEURITIS: Primary | ICD-10-CM

## 2021-02-01 DIAGNOSIS — M47.819 FACET ARTHROPATHY: ICD-10-CM

## 2021-02-01 DIAGNOSIS — M51.36 DDD (DEGENERATIVE DISC DISEASE), LUMBAR: ICD-10-CM

## 2021-02-01 PROCEDURE — 99214 OFFICE O/P EST MOD 30 MIN: CPT | Performed by: PHYSICAL MEDICINE & REHABILITATION

## 2021-02-01 RX ORDER — PREGABALIN 150 MG/1
150 CAPSULE ORAL 2 TIMES DAILY
Qty: 60 CAP | Refills: 0 | Status: SHIPPED | OUTPATIENT
Start: 2021-02-01

## 2021-02-01 RX ORDER — TOPIRAMATE 25 MG/1
TABLET ORAL
Qty: 90 TAB | Refills: 1 | Status: SHIPPED | OUTPATIENT
Start: 2021-02-01

## 2021-02-01 NOTE — LETTER
2/1/2021 Patient: Michaelle Sauceda YOB: 1960 Date of Visit: 2/1/2021 Ilda Saint, NP 
[address] [place] [address] [address] [place] [address] [address] Fax: [phone] Dear Ilda Saint, NP, Thank you for referring Ms. Jessie Montelongo to [address] for evaluation. My notes for this consultation are attached. If you have questions, please do not hesitate to call me. I look forward to following your patient along with you. Sincerely, Dary Jaimes MD

## 2021-02-15 ENCOUNTER — OFFICE VISIT (OUTPATIENT)
Dept: ORTHOPEDIC SURGERY | Age: 61
End: 2021-02-15
Payer: COMMERCIAL

## 2021-02-15 VITALS
BODY MASS INDEX: 39.15 KG/M2 | DIASTOLIC BLOOD PRESSURE: 82 MMHG | SYSTOLIC BLOOD PRESSURE: 135 MMHG | HEIGHT: 65 IN | TEMPERATURE: 97.6 F | WEIGHT: 235 LBS | RESPIRATION RATE: 20 BRPM | HEART RATE: 83 BPM

## 2021-02-15 DIAGNOSIS — R20.2 NUMBNESS AND TINGLING OF BOTH LOWER EXTREMITIES: Primary | ICD-10-CM

## 2021-02-15 DIAGNOSIS — R20.0 NUMBNESS AND TINGLING OF BOTH LOWER EXTREMITIES: ICD-10-CM

## 2021-02-15 DIAGNOSIS — R20.2 NUMBNESS AND TINGLING OF BOTH LOWER EXTREMITIES: ICD-10-CM

## 2021-02-15 DIAGNOSIS — R20.0 NUMBNESS AND TINGLING OF BOTH LOWER EXTREMITIES: Primary | ICD-10-CM

## 2021-02-15 DIAGNOSIS — R94.131 ABNORMAL EMG: ICD-10-CM

## 2021-02-15 PROCEDURE — 95886 MUSC TEST DONE W/N TEST COMP: CPT | Performed by: PHYSICAL MEDICINE & REHABILITATION

## 2021-02-15 PROCEDURE — 95910 NRV CNDJ TEST 7-8 STUDIES: CPT | Performed by: PHYSICAL MEDICINE & REHABILITATION

## 2021-02-15 NOTE — PROGRESS NOTES
[facility]  [address], [address]  [address]  Phone: [phone]  Fax: [phone]  : 1960  PCP: Beth Colbert NP  2/15/2021    ELECTROMYOGRAPHY AND NERVE CONDUCTION STUDIES    India Iverson was referred by Dr. Leigh Mauricio for electrodiagnostic evaluation of BLE paraesthesia. NCV & EMG Findings:  Evaluation of the left tibial motor nerve showed reduced amplitude (2.3 mV). All remaining nerves (as indicated in the following tables) were within normal limits. Left vs. Right side comparison data for the Fibular motor nerve indicates abnormal L-R velocity difference (Poplt-B Fib, 23 m/s). The tibial motor nerve indicates abnormal L-R amplitude difference (64.1 %). All examined muscles (as indicated in the following table) showed no evidence of electrical instability. INTERPRETATION  This is a mildly abnormal electrodiagnostic examination. These findings may be consistent with:   1. low amplitudes at the left AH - I hesitate to label this as axonal injury to the tibial nerve. CLINICAL INTERPRETATION  Her electrodiagnostic findings are most likely related to her surgical history in the foot rather than a tibial mononeuropathy. She has symptoms of allodynia in the right thigh, most clinically correlating to meralgia paresthetica. This is also present on the left side as numbness to touch. HISTORY OF PRESENT ILLNESS  India Iverson is a 62 y.o. female. Pt presents today for BLE EMG evaluation of BLE paraesthesia from the buttocks to the knee in the LLE and foot in the RLE. However, the notes the majority of her symptoms are in the anterior thighs to the knees.      PAST MEDICAL HISTORY   Past Medical History:   Diagnosis Date    Arthritis     Depression     Diabetes ([address]     Hypertension     Menopause     Sciatica        Past Surgical History:   Procedure Laterality Date    FOOT/TOES SURGERY PROC UNLISTED      HX GYN      hysterectomy    HX HYSTERECTOMY      HX ORTHOPAEDIC      left foot x's 3   . MEDICATIONS    Current Outpatient Medications   Medication Sig Dispense Refill    topiramate (TOPAMAX) 25 mg tablet 3 tabs PO QHS 90 Tab 1    pregabalin (Lyrica) 150 mg capsule Take 1 Cap by mouth two (2) times a day. Max Daily Amount: 300 mg. 60 Cap 0    buPROPion XL (Wellbutrin XL) 300 mg XL tablet Take 1 Tab by mouth.  meloxicam (Mobic) 15 mg tablet Take 1 Tab by mouth daily (with breakfast). 30 Tab 0    famotidine (PEPCID) 40 mg tablet Take 1 Tab by mouth daily. I am prescribing this medication for protective measures while taking the NSAID. 30 Tab 0    pregabalin (LYRICA) 225 mg capsule Take 1 Cap by mouth two (2) times a day. Max Daily Amount: 450 mg. 60 Cap 1    pregabalin (Lyrica) 150 mg capsule Take 1 Cap by mouth two (2) times a day. Max Daily Amount: 300 mg. 60 Cap 1    cyclobenzaprine (FLEXERIL) 5 mg tablet Take 1 Tab by mouth two (2) times daily as needed for Muscle Spasm(s). 30 Tab 0    buPROPion SR (Wellbutrin SR) 150 mg SR tablet 1 tablet in the morning      pregabalin (Lyrica) 75 mg capsule Take 1 Cap by mouth two (2) times a day. Max Daily Amount: 150 mg. 60 Cap 1    SITagliptin-metFORMIN (JANUMET) 50-1,000 mg per tablet Take 1 Tab by mouth two (2) times daily (with meals).  amLODIPine (NORVASC) 5 mg tablet Take 5 mg by mouth daily.  atorvastatin (LIPITOR) 10 mg tablet Take  by mouth daily.  acetaminophen (TYLENOL ARTHRITIS PAIN) 650 mg TbER Take 650 mg by mouth every eight (8) hours.  metFORMIN (GLUCOPHAGE) 1,000 mg tablet Take 1,000 mg by mouth two (2) times daily (with meals).  glipiZIDE (GLUCOTROL) 5 mg tablet Take  by mouth two (2) times a day.  gabapentin (NEURONTIN) 300 mg capsule Take 300 mg by mouth three (3) times daily.  losartan 50 mg tab 100 mg, hydroCHLOROthiazide 12.5 mg cap 12.5 mg Take  by mouth daily.  States using dosage 160/25mg      methocarbamol (ROBAXIN-750) 750 mg tablet Take 1 Tab by mouth four (4) times daily. 20 Tab 0    IBUPROFEN (MOTRIN PO) Take  by mouth. ALLERGIES  Allergies   Allergen Reactions    Demerol [Meperidine] Anxiety          SOCIAL HISTORY    Social History     Socioeconomic History    Marital status:      Spouse name: Not on file    Number of children: Not on file    Years of education: Not on file    Highest education level: Not on file   Tobacco Use    Smoking status: Former Smoker     Years: 30.00    Smokeless tobacco: Never Used    Tobacco comment: vapor   Substance and Sexual Activity    Alcohol use: No    Drug use: No    Sexual activity: Yes     Partners: Female       FAMILY HISTORY  Family History   Problem Relation Age of Onset    Diabetes Mother     Heart Disease Mother     Heart Disease Sister     Heart Disease Sister          PHYSICAL EXAMINATION  Visit Vitals  LMP 08/01/2007       Pain Assessment  2/1/2021   Location of Pain Back   Location Modifiers -   Severity of Pain 6   Quality of Pain Throbbing; Sharp;Dull;Aching;Burning   Quality of Pain Comment bilateral thigh stabbing pain and burning   Duration of Pain Persistent   Frequency of Pain Constant   Aggravating Factors Standing   Aggravating Factors Comment sitting too long   Limiting Behavior Yes   Relieving Factors Rest   Relieving Factors Comment laying down   Result of Injury No           Constitutional:  Well developed, well nourished, in no acute distress. Psychiatric: Affect and mood are appropriate. Integumentary: No rashes or abrasions noted on exposed areas.         SPINE/MUSCULOSKELETAL EXAM    On brief examination: allodynia in a lateral femoral cutaneous nerve distribution bilaterally       NCV & EMG Findings:  Nerve Conduction Studies  Anti Sensory Summary Table     Stim Site NR Peak (ms) Norm Peak (ms) O-P Amp (µV) Norm O-P Amp Site1 Site2 Delta-P (ms) Dist (cm) Itz (m/s) Norm Itz (m/s)   Left Sup Fibular Anti Sensory (Ant Lat Mall)   14 cm    3.8 <4.4 10.9 >5.0 14 cm Ant Lat Mall 3.8 14.0 37 >32   Right Sup Fibular Anti Sensory (Ant Lat Mall)   14 cm    3.8 <4.4 9.4 >5.0 14 cm Ant Lat Mall 3.8 14.0 37 >32   Left Sural Anti Sensory (Lat Mall)   Calf    4.0 <4.0 9.7 >5.0 Calf Lat Mall 4.0 14.0 35 >35   Right Sural Anti Sensory (Lat Mall)   Calf    4.0 <4.0 5.9 >5.0 Calf Lat Mall 4.0 14.0 35 >35     Motor Summary Table     Stim Site NR Onset (ms) Norm Onset (ms) O-P Amp (mV) Norm O-P Amp Site1 Site2 Delta-0 (ms) Dist (cm) Itz (m/s) Norm Itz (m/s)   Left Fibular Motor (Ext Dig Brev)   Ankle    5.0 <6.1 7.3 >2.5 B Fib Ankle 5.9 30.0 51 >38   B Fib    10.9  5.5  Poplt B Fib 1.1 7.0 64 >40   Poplt    12.0  8.0          Right Fibular Motor (Ext Dig Brev)   Ankle    3.8 <6.1 10.7 >2.5 B Fib Ankle 6.8 31.0 46 >38   B Fib    10.6  11.3  Poplt B Fib 1.7 7.0 41 >40   Poplt    12.3  10.6          Left Tibial Motor (Abd Jose Brev)   Ankle    4.8 <6.1 2.3 >3.0 Knee Ankle 9.4 36.0 38 >35   Knee    14.2  2.0          Right Tibial Motor (Abd Jose Brev)   Ankle    5.5 <6.1 6.4 >3.0 Knee Ankle 9.4 37.0 39 >35   Knee    14.9  3.8            EMG     Side Muscle Nerve Root Ins Act Fibs Psw Amp Dur Poly Recrt Int Sally Smolder Comment   Right TensorFascLat SupGluteal L4-5, S1 Nml Nml Nml Nml Nml 0 Nml Nml    Right VastusMed Femoral L2-4 Nml Nml Nml Nml Nml 0 Nml Nml    Right AntTibialis Dp Br Fibular L4-5 Nml Nml Nml Nml Nml 0 Nml Nml    Right Gastroc Tibial S1-2 Nml Nml Nml Nml Nml 0 Nml Nml    Right AdductorLong Obturator L2-4 Nml Nml Nml Nml Nml 0 Nml Nml    Left TensorFascLat SupGluteal L4-5, S1 Nml Nml Nml Nml Nml 0 Nml Nml    Left VastusMed Femoral L2-4 Nml Nml Nml Nml Nml 0 Nml Nml    Left AntTibialis Dp Br Fibular L4-5 Nml Nml Nml Nml Nml 0 Nml Nml    Left Gastroc Tibial S1-2 Nml Nml Nml Nml Nml 0 Nml Nml    Left AdductorLong Obturator L2-4 Nml Nml Nml Nml Nml 0 Nml Nml    Right Lumbo Parasp Up Rami L1-2 Nml Nml Nml Right Lumbo Parasp Mid Rami L3-4 Nml Nml Nml         Right Lumbo Parasp Low Rami L5-S1 Nml Nml Nml         Left Lumbo Parasp Up Rami L1-2 Nml Nml Nml         Left Lumbo Parasp Mid Rami L3-4 Nml Nml Nml         Left Lumbo Parasp Low Rami L5-S1 Nml Nml Nml             Nerve Conduction Studies  Anti Sensory Left/Right Comparison     Stim Site L Lat (ms) R Lat (ms) L-R Lat (ms) L Amp (µV) R Amp (µV) L-R Amp (%) Site1 Site2 L Itz (m/s) R Itz (m/s) L-R Itz (m/s)   Sup Fibular Anti Sensory (Ant Lat Mall)   14 cm 3.8 3.8 0.0 10.9 9.4 13.8 14 cm Ant Lat Mall 37 37 0   Sural Anti Sensory (Lat Mall)   Calf 4.0 4.0 0.0 9.7 5.9 39.2 Calf Lat Mall 35 35 0     Motor Left/Right Comparison     Stim Site L Lat (ms) R Lat (ms) L-R Lat (ms) L Amp (mV) R Amp (mV) L-R Amp (%) Site1 Site2 L Itz (m/s) R Itz (m/s) L-R Itz (m/s)   Fibular Motor (Ext Dig Brev)   Ankle 5.0 3.8 1.2 7.3 10.7 31.8 B Fib Ankle 51 46 5   B Fib 10.9 10.6 0.3 5.5 11.3 51.3 Poplt B Fib 64 41 23   Poplt 12.0 12.3 0.3 8.0 10.6 24.5        Tibial Motor (Abd Jose Brev)   Ankle 4.8 5.5 0.7 2.3 6.4 64.1 Knee Ankle 38 39 1   Knee 14.2 14.9 0.7 2.0 3.8 47.4              Waveforms:                            [facility]  OFFICE PROCEDURE PROGRESS NOTE        Chart reviewed for the following:   Whit BOLIVAR, have reviewed the History, Physical and updated the Allergic reactions for [address] performed immediately prior to start of procedure:   Whit BOLIVAR, have performed the following reviews on [address] prior to the start of the procedure:            * Patient was identified by name and date of birth   * Agreement on procedure being performed was verified  * Risks and Benefits explained to the patient  * Procedure site verified and marked as necessary  * Patient was positioned for comfort  * Consent was signed and verified     Time: 3:30 PM    Date of procedure: 2/15/2021    Procedure performed by:  Chaya Yi MD    Provider accompanied by: Michael. Patient accompanied by: Self.     How tolerated by patient: tolerated the procedure well with no complications    Post Procedural Pain Scale: 0 - No Hurt    Comments: none    Written by Elvira Gold, [address] [facility] [address] as dictated by Nathan Romero MD

## 2021-02-23 NOTE — PROGRESS NOTES
[facility] SPECIALISTS  [address], [address]  Phone: [phone]  Fax: [phone]        PROGRESS NOTE    CONSENT:  Pursuant to the emergency declaration under the [address] and [facility] 1135 waiver authority and the [employer] and [employer] Act, this Virtual Visit was conducted, with patient's consent, to reduce the patient's risk of exposure to COVID-19 and provide continuity of care for an established patient. Services were unable to be provided through a video synchronous discussion virtually to substitute for in-person appointment. Subsequently, the patient was consulted through a telephone discussion. ENCOUNTER DURATION (minutes): 13 minutes 24 seconds      HISTORY OF PRESENT ILLNESS:  The patient is a 62 y.o. female. Ms. Javier Sanches is being consulted from home by me via telephone at the [facility] office for follow up of bilateral buttocks pain radiating into the BLE (RLE>LLE) to the knee in the LLE and the foot in the RLE. Previously, she was seen for progressive low back pain radiating into the BLE (RLE>LLE) anterolaterally to the knee x 5 years without trauma. Her pain is exacerbated by prolonged positions. Her pain disrupts her sleep. Pt was previously on Neurontin 300 mg TID for back pain. She recalls tolerating the medication with benefit. She could not tolerate a higher dose of NEURONTIN. Pt failed LYRICA 225 mg BID secondary to minimal relief. Pt reports intolerance to PREDNISONE, secondary to drowsiness. Pt takes Wellbutrin. She was previously seen by [address] [place] [address] for the same pain complaints. Pt had previous chiropractic care. She is currently enrolled in PT. Patient denies previous spinal surgery or injections. Pt denies change in bowel or bladder habits. Pt denies fever, weight loss, or skin changes. Pt is a previous smoker. PmHx of obesity, DM, depression.  The patient has a history of DM and reports blood sugars are well controlled, consistently remaining below 200. Her DM is followed by her PCP, Carlton Li MD. Note from [place], [place] dated 6/24/2020 indicating patient was seen with c/o low back pain with right sided sciatica. Treated with Lyrica. Note from Dr. Connie Cr 1/58/4927 [facility] patient was seen with c/o pain in the lower lumbar region involving the BLE (R>L) to the knees. Her pain was 5/10. Switched her from Neurontin 300 mg TID to Lyrica. Note from Jack Dyer NP dated 1/30/2020 indicating patient was seen with c/o right sided de quervain tenosynovitis. Therapy notes dated 10/20/2020 reviewed and indicated her pain was 3/10. Note from Dr. Aston Espinal dated 1/13/2021 indicating patient has right achilles tendonitis. Referred to Dr. Kenya Deleon for right wrist pain. L spine XR dated 11/8/2019 films not independently reviewed. Per report, no acute findings. I independently reviewed the films and noted: left iliac crest seems to be slightly higher level than the right. L spine MRI dated 1/28/2021 films independently reviewed. Per report, facet arthropathy at multiple levels. No central canal stenosis is seen. Small degenerative bulge and caudal aspect of the right L4-5 neuroforamina new from 2014 probably not clinically significant. Exam stable from 2014. At her last clinical appointment, I weaned her off of Lyrica 225 mg BID secondary to minimal benefit. I tried her on Topamax 75 mg qhs. I ordered a BLE EMG. At today's telephone consultation, the patient reports pain location and distribution remains unchanged. She rates her pain 2-8/10, previously 3-8/10. She is tolerating the Topamax 75 mg qhs at this time. She reports decreased appetite and nausea since starting the medication but notes it as tolerable. Pt denies change in bowel or bladder habits. Pt denies any signs of weakness.  BLE EMG dated 2/15/2021 by Dr. Jan Flores may be consistent with low amplitudes at the left AH - I hesitate to label this as axonal injury to the tibial nerve.  reviewed. There is no height or weight on file to calculate BMI. PCP: Jalyn Singletary NP      Past Medical History:   Diagnosis Date    Arthritis     Depression     Diabetes ([facility] [address]     Hypertension     Menopause     Sciatica         Social History     Socioeconomic History    Marital status:      Spouse name: Not on file    Number of children: Not on file    Years of education: Not on file    Highest education level: Not on file   Occupational History    Not on file   Social Needs    Financial resource strain: Not on file    Food insecurity     Worry: Not on file     Inability: Not on file    Transportation needs     Medical: Not on file     Non-medical: Not on file   Tobacco Use    Smoking status: Former Smoker     Years: 30.00    Smokeless tobacco: Never Used    Tobacco comment: vapor   Substance and Sexual Activity    Alcohol use: No    Drug use: No    Sexual activity: Yes     Partners: Female   Lifestyle    Physical activity     Days per week: Not on file     Minutes per session: Not on file    Stress: Not on file   Relationships    Social connections     Talks on phone: Not on file     Gets together: Not on file     Attends [employer] service: Not on file     Active member of club or organization: Not on file     Attends meetings of clubs or organizations: Not on file     Relationship status: Not on file    Intimate partner violence     Fear of current or ex partner: Not on file     Emotionally abused: Not on file     Physically abused: Not on file     Forced sexual activity: Not on file   Other Topics Concern    Not on file   Social History Narrative    Not on file       Current Outpatient Medications   Medication Sig Dispense Refill    topiramate (TOPAMAX) 25 mg tablet 3 tabs PO QHS 90 Tab 1    meloxicam (Mobic) 15 mg tablet Take 1 Tab by mouth daily (with breakfast).  27 Tab 0    cyclobenzaprine (FLEXERIL) 5 mg tablet Take 1 Tab by mouth two (2) times daily as needed for Muscle Spasm(s). 30 Tab 0    buPROPion SR (Wellbutrin SR) 150 mg SR tablet 1 tablet in the morning      SITagliptin-metFORMIN (JANUMET) 50-1,000 mg per tablet Take 1 Tab by mouth two (2) times daily (with meals).  amLODIPine (NORVASC) 5 mg tablet Take 5 mg by mouth daily.  atorvastatin (LIPITOR) 10 mg tablet Take  by mouth daily.  acetaminophen (TYLENOL ARTHRITIS PAIN) 650 mg TbER Take 650 mg by mouth every eight (8) hours.  glipiZIDE (GLUCOTROL) 5 mg tablet Take  by mouth two (2) times a day.  losartan 50 mg tab 100 mg, hydroCHLOROthiazide 12.5 mg cap 12.5 mg Take  by mouth daily. States using dosage 160/25mg      pregabalin (Lyrica) 150 mg capsule Take 1 Cap by mouth two (2) times a day. Max Daily Amount: 300 mg. 60 Cap 0    buPROPion XL (Wellbutrin XL) 300 mg XL tablet Take 1 Tab by mouth.  famotidine (PEPCID) 40 mg tablet Take 1 Tab by mouth daily. I am prescribing this medication for protective measures while taking the NSAID. 30 Tab 0    pregabalin (LYRICA) 225 mg capsule Take 1 Cap by mouth two (2) times a day. Max Daily Amount: 450 mg. 60 Cap 1    pregabalin (Lyrica) 150 mg capsule Take 1 Cap by mouth two (2) times a day. Max Daily Amount: 300 mg. 60 Cap 1    pregabalin (Lyrica) 75 mg capsule Take 1 Cap by mouth two (2) times a day. Max Daily Amount: 150 mg. 60 Cap 1    metFORMIN (GLUCOPHAGE) 1,000 mg tablet Take 1,000 mg by mouth two (2) times daily (with meals).  gabapentin (NEURONTIN) 300 mg capsule Take 300 mg by mouth three (3) times daily.  methocarbamol (ROBAXIN-750) 750 mg tablet Take 1 Tab by mouth four (4) times daily. 20 Tab 0    IBUPROFEN (MOTRIN PO) Take  by mouth. Allergies   Allergen Reactions    Demerol [Meperidine] Anxiety          PHYSICAL EXAMINATION  Unable to perform examination secondary to COVID-19.     CONSTITUTIONAL: NAD, A&O x 3    ASSESSMENT   Diagnoses and all orders for this visit:    1. Lumbar neuritis    2. Lumbosacral spondylosis without myelopathy    3. DDD (degenerative disc disease), lumbar    4. Facet arthropathy        IMPRESSION AND PLAN:  The patient consented to the tele health visit and was aware that there would be a charge. During today's telephone consultation patient had c/o  bilateral buttocks pain radiating into the BLE (RLE>LLE) to the knee in the LLE and the foot in the RLE. Multiple treatment options were discussed. Pt has minimal pathology on her L spine MRI or EMG to account for her pain complaints. She reports BLE pain is worse than lower back pain. Consideration was given to referring her to a vascular specialist. This will be discussed at her next appointment. She should continue on the Topamax 75 mg qhs as it was too early to assess the full benefits of this dose or increase the dose at this time. Pt appears to be neurologically intact. I will see the patient back in 3 week's time or earlier if needed. Written by Reina Thakkar, as dictated by Augusta Guzmán MD  I examined the patient, reviewed and agree with the note.

## 2021-02-24 ENCOUNTER — VIRTUAL VISIT (OUTPATIENT)
Dept: ORTHOPEDIC SURGERY | Age: 61
End: 2021-02-24
Payer: COMMERCIAL

## 2021-02-24 DIAGNOSIS — M47.817 LUMBOSACRAL SPONDYLOSIS WITHOUT MYELOPATHY: ICD-10-CM

## 2021-02-24 DIAGNOSIS — M51.36 DDD (DEGENERATIVE DISC DISEASE), LUMBAR: ICD-10-CM

## 2021-02-24 DIAGNOSIS — M54.16 LUMBAR NEURITIS: Primary | ICD-10-CM

## 2021-02-24 DIAGNOSIS — M47.819 FACET ARTHROPATHY: ICD-10-CM

## 2021-02-24 PROCEDURE — 99442 PR PHYS/QHP TELEPHONE EVALUATION 11-20 MIN: CPT | Performed by: PHYSICAL MEDICINE & REHABILITATION

## 2021-02-24 NOTE — LETTER
2/24/2021 Patient: Jean Smith YOB: 1960 Date of Visit: 2/24/2021 Pam Bazan NP 
[address] [place] [address] [address] [address] [address] Fax: [phone] Dear Pam Bazan NP, Thank you for referring Ms. Volodymyr Breen to [address] for evaluation. My notes for this consultation are attached. If you have questions, please do not hesitate to call me. I look forward to following your patient along with you. Sincerely, Denise Jiménez MD

## 2021-03-01 DIAGNOSIS — M47.819 FACET ARTHROPATHY: ICD-10-CM

## 2021-03-01 DIAGNOSIS — M51.36 DDD (DEGENERATIVE DISC DISEASE), LUMBAR: ICD-10-CM

## 2021-03-01 DIAGNOSIS — M54.16 LUMBAR NEURITIS: ICD-10-CM

## 2021-03-01 DIAGNOSIS — M47.817 LUMBOSACRAL SPONDYLOSIS WITHOUT MYELOPATHY: ICD-10-CM

## 2022-03-20 PROBLEM — R22.30 AXILLARY MASS: Status: ACTIVE | Noted: 2017-09-08

## 2022-04-20 ENCOUNTER — TRANSCRIBE ORDER (OUTPATIENT)
Dept: SCHEDULING | Age: 62
End: 2022-04-20

## 2022-04-20 DIAGNOSIS — Z12.31 SCREENING MAMMOGRAM, ENCOUNTER FOR: Primary | ICD-10-CM

## 2022-08-12 ENCOUNTER — HOSPITAL ENCOUNTER (OUTPATIENT)
Dept: PHYSICAL THERAPY | Age: 62
Discharge: HOME | End: 2022-08-12
Payer: COMMERCIAL

## 2022-08-12 PROCEDURE — 97161 PT EVAL LOW COMPLEX 20 MIN: CPT

## 2022-08-12 NOTE — PROGRESS NOTES
PT DAILY TREATMENT NOTE/VESTIBULAR EVAL     Patient Name: Adonis Cadet  JSMF:[id number]  : 1960  [x]  Patient  Verified  Payor: Doug Berger / Plan: [address] [employer] / Product Type: HMO /    In time: 8:15  Out time: 8:55  Total Treatment Time (min): 40  Visit #: 1 of 12    Treatment Area: BPV (benign positional vertigo), left [H81.12]    SUBJECTIVE  Pain Level (0-10 scale):  0/10  []constant []intermittent []improving []worsening []no change since onset    Any medication changes, allergies to medications, adverse drug reactions, diagnosis change, or new procedure performed?: [x] No    [] Yes (see summary sheet for update)  Subjective functional status/changes:       Mechanism of Injury: pt. Reports symptoms began in . She reports having mostly nausea and also dizziness. Reports she feels like she leans a lot to left when walking. Reports in  she had episode of severe dizziness and was unable to stand. Denies having any more episodes like that but still there. Describes dizziness as a lean. Denies spinning dizziness. Also having some trouble with depth perception on stairs. Denies changes in hearing and denies ear pain. Denies ringing in ears. Increased dizziness with first getting out of bed. Reports better with Meclizine but tries not to take it. Reports hasn't been to an ENT. CT scan didn't show anything. Work Hx: still working, sanitizing medical equipment. Pt Goals: to have better balance and less dizziness.        OBJECTIVE/EXAMINATION    8 min Therapeutic Exercise:  [] See flow sheet : HEP   Rationale: increase ROM and increase strength to improve the patients ability to perform ADLs          With   [x] TE   [] TA   [] neuro   [] other: Patient Education: [x] Review HEP    [] Progressed/Changed HEP based on:   [] positioning   [] body mechanics   [] transfers   [] heat/ice application    [] other:      Physical Therapy Evaluation - Vestibular    C/S ROM: [] WFL    [] Limited    Describe: mild decrease in bilateral rotation with pain at end ranges    Oculomotor Tests: (Fixation Not Blocked)       Ocular ROM:   [] [facility]    [] Limited    Describe:       Spontaneous Nystag. [x] Neg     [] Pos    [] Left    [] Right       Gaze Holding Nystag. [x] Neg     [] Pos    [] Left    [] Right        Smooth Pursuit  [x] Neg     [] Pos    [] Left    [] Right        Saccades   [x] Neg     [] Pos    [] Left    [] Right        VOR - Slow Head Mvmt [x] Neg     [] Pos    [] Left    [] Right  increased dizziness horizontal and vertical        VOR - Fast Head Mvmt [] Neg     [] Pos    [] Left    [] Right        Head Thrust  [x] Neg     [] Pos    [] Left    [] Right        Static Visual Acuity [] Neg     [] Pos    [] Left    [] Right        Dynamic Visual Acuity [] Neg     [] Pos    [] Left    [] Right     Other Special Tests:       Vertebral Artery Testing [x] Neg     [] Pos    [x] Left    [x] Right       Hallpike-Kiara Maneuver [x] Neg     [] Pos    [x] Left    [x] Right       Roll Test   [] Neg     [] Pos    [] Left    [] Right       Bailey Balance Scale [] Neg     [] Pos    Score:       Dynamic Gait Index [] Neg     [] Pos    Score:       Functional Gait Assess. [] Neg     [] Pos    Score:    Balance Standard Testing (Eyes Open/Eyes Closed - EO/EC)       Romberg   [] WFL    [x] Pos    Describe: instability with eyes closed            Stand on Foam  [] WFL    [] Pos    Describe:        Standing on Rail  [] WFL    [] Pos    Describe:        Sharpened Romberg [] WFL    [x] Pos    Describe:  Instability        Single Leg Stand  [] [facility]    [] Pos    Describe:  15 seconds bilateraly     Other Tests:  30 second sit to stand test: 6x  FGA: 18/30       Pain Level (0-10 scale) post treatment: 0/10    ASSESSMENT/Changes in Function:      [x]  See Plan of Care  []  See progress note/recertification  []  See Discharge Summary         Progress towards goals / Updated goals:  See POC    PLAN  []  Upgrade activities as tolerated     [x] Continue plan of care  []  Update interventions per flow sheet       []  Discharge due to:_  []  Other:_      Akhil Peng, RAJAT 8/12/2022  8:11 AM

## 2022-08-12 NOTE — PROGRESS NOTES
[facility] COMPANY OF [address]  [phone] fax    Plan of Care/ Statement of Necessity for Physical Therapy Services    Patient name: Natalie Livingston Start of Care: 2022   Referral source: Alma Rosa Jauregui NP : 1960    Medical Diagnosis: BPV (benign positional vertigo), left [H81.12]  Payor: Krysta Hardy / Plan: [employer] HMO / Product Type: HMO /  Onset Date: 2022    Treatment Diagnosis: abnormalities of gait and mobility    Prior Hospitalization: see medical history Provider#: [id number]   Medications: Verified on Patient summary List    Comorbidities: diabetes, HTN, depression, arthritis   Prior Level of Function: Ind with ADLs, Ind with ambulation, working     [employer] of Care and following information is based on the information from the initial evaluation. Assessment/ key information:  pt. Is a 65year old female c/o dizziness and difficulty with walking. She reports having some issues with her balance for a while but in  had episode of severe dizziness and was unable to stand, so went to emergency room. She has been taking Meclizine as needed. She denies room spinning dizziness and describes dizziness as tilting. She also reports having difficulty with her depth perception. She presents with negative smooth pursuits and negative saccades testing. She has increased dizziness with VOR testing. Negative [place]-Hallpike bilaterally but increased dizziness coming back up to starting position. She also demonstrates decreased balance with her eyes closed. 30 second sit to stand test was 6x. FGA score was 18/30. Skilled PT is medically necessary in order to improve dizziness and balance for increased ease of ambulation and improved quality of life.      Evaluation Complexity History MEDIUM  Complexity : 1-2 comorbidities / personal factors will impact the outcome/ POC ; Examination MEDIUM Complexity : 3 Standardized tests and measures addressing body structure, function, activity limitation and / or participation in recreation  ;Presentation LOW Complexity : Stable, uncomplicated  ;Clinical Decision Making MEDIUM Complexity : FOTO score of 26-74  Overall Complexity Rating: LOW   Problem List: decrease ROM, decrease strength, impaired gait/ balance, decrease ADL/ functional abilitiies, decrease activity tolerance, decrease flexibility/ joint mobility, and decrease transfer abilities   Treatment Plan may include any combination of the following: Therapeutic exercise, Therapeutic activities, Neuromuscular re-education, Physical agent/modality, Gait/balance training, Manual therapy, Patient education, and Self Care training  Patient / Family readiness to learn indicated by: asking questions  Persons(s) to be included in education: patient (P)  Barriers to Learning/Limitations: None  Patient Goal (s): to have less dizziness  Patient Self Reported Health Status: good  Rehabilitation Potential: good    Short Term Goals: To be accomplished in 1 weeks:  Patient will demonstrate compliance with HEP in order to increase ease of ambulation. Long Term Goals: To be accomplished in 6 weeks:  Goal: Patient will improve FOTO score by 10 points in order to demonstrate a significant improvement in function. Status at evaluation/last progress note: 52    2. Goal: Patient will improve FGA score by 4 points in order to demonstrate a significant improvement in balance during ambulation. Status at evaluation/last progress note: 18/30    3. Goal: Patient will improve 30 second sit to stand test to 8x in order to increase ease of transfers at home. Status at evaluation/last progress note: 6x    4. Goal: Patient will improve SOT score to WNL for her age group in order to improve quality of life. Status at evaluation/last progress note: n/a    Frequency / Duration: Patient to be seen 2 times per week for 6 weeks.     Patient/  Caregiver education and instruction: Diagnosis, prognosis, exercises   [x]  Plan of care has been reviewed with PTA Nolia Buerger, PT 8/12/2022 9:02 AM    ________________________________________________________________________    I certify that the above Therapy Services are being furnished while the patient is under my care. I agree with the treatment plan and certify that this therapy is necessary.     Physician's Signature:____________Date:_________TIME:________     Fidel Ibarra NP  ** Signature, Date and Time must be completed for valid certification **    Please sign and return to [facility]  [address] [place] [address]  [phone] fax

## 2022-08-15 ENCOUNTER — HOSPITAL ENCOUNTER (OUTPATIENT)
Dept: PHYSICAL THERAPY | Age: 62
End: 2022-08-15
Payer: COMMERCIAL

## 2022-08-22 ENCOUNTER — HOSPITAL ENCOUNTER (OUTPATIENT)
Dept: PHYSICAL THERAPY | Age: 62
Discharge: HOME | End: 2022-08-22
Payer: COMMERCIAL

## 2022-08-22 PROCEDURE — 97112 NEUROMUSCULAR REEDUCATION: CPT

## 2022-08-22 PROCEDURE — 97750 PHYSICAL PERFORMANCE TEST: CPT

## 2022-08-22 NOTE — PROGRESS NOTES
PT DAILY TREATMENT NOTE     Patient Name: Damian Bolanos  Date:2022  : 1960  [x]  Patient  Verified  Payor: Garland Davisjustine / Plan: [address] [employer] / Product Type: HMO /    In time: 5:23  Out time: 6:00  Total Treatment Time (min): 37  Visit #: 2 of 12    Treatment Area: Other abnormalities of gait and mobility [R26.89]    SUBJECTIVE  Pain Level (0-10 scale): 0/10  Any medication changes, allergies to medications, adverse drug reactions, diagnosis change, or new procedure performed?: [x] No    [] Yes (see summary sheet for update)  Subjective functional status/changes:   [] No changes reported  Pt. Reports she is feeling about the same today. She continues to take Meclizine for her dizziness. OBJECTIVE    10 min: physical performance testing: SOT     27 min Neuromuscular Re-education:  []  See flow sheet : standing balance activities, VOR activities, ambulation with head turns, ambulation with eye turns   Rationale: increase strength, improve coordination, and improve balance  to improve the patients ability to increase ease of ambulation          With   [] TE   [] TA   [x] neuro   [] other: Patient Education: [x] Review HEP    [] Progressed/Changed HEP based on:   [] positioning   [] body mechanics   [] transfers   [] heat/ice application    [] other:      Other Objective/Functional Measures:   SOT: 63 points with deficits in somatosensory and visual components  Significant instability with ambulation with head turns with some improvement with eyes only  Dizziness with VOR activities     Pain Level (0-10 scale) post treatment: 0/10    ASSESSMENT/Changes in Function:  pt. Initiated PT and is compliant with her HEP. She continues to have dizziness with VOR activities and has decreased balance on compliant surfaces. She also continues to have difficulty with bending activities.      Patient will continue to benefit from skilled PT services to modify and progress therapeutic interventions, address functional mobility deficits, address ROM deficits, address strength deficits, analyze and address soft tissue restrictions, analyze and cue movement patterns, analyze and modify body mechanics/ergonomics, assess and modify postural abnormalities, and address imbalance/dizziness to attain remaining goals. Progress towards goals / Updated goals:  Short Term Goals: To be accomplished in 1 weeks:  Patient will demonstrate compliance with HEP in order to increase ease of ambulation. Met      Long Term Goals: To be accomplished in 6 weeks:  Goal: Patient will improve FOTO score by 10 points in order to demonstrate a significant improvement in function. Status at evaluation/last progress note: 52     2. Goal: Patient will improve FGA score by 4 points in order to demonstrate a significant improvement in balance during ambulation. Status at evaluation/last progress note: 18/30     3. Goal: Patient will improve 30 second sit to stand test to 8x in order to increase ease of transfers at home. Status at evaluation/last progress note: 6x     4. Goal: Patient will improve SOT score to WNL for her age group in order to improve quality of life.    Status at evaluation/last progress note: n/a    PLAN  []  Upgrade activities as tolerated     [x]  Continue plan of care  []  Update interventions per flow sheet       []  Discharge due to:_  []  Other:_      Kristy Vasques, PT 8/22/2022  8:02 AM    Future Appointments   Date Time Provider Ronda Joseph   8/22/2022  5:15 PM Adelaida Bound, PT [facility]   8/26/2022  3:45 PM Gunner Perla, PT [facility]   8/29/2022 11:15 AM Gunner Perla, PT [facility]   9/2/2022  4:30 PM Gunner Perla, PT [facility]   9/6/2022  8:15 AM Maral Luna, PTA [facility]   9/9/2022  7:30 AM Adelaida Bound, PT [facility]   9/13/2022  4:30 PM Gunner Perla, PT [facility]   9/16/2022  4:30 PM Gunner Duncan PT [facility]   9/20/2022  6:00 PM RAJAT Schuler [facility] 9/23/2022  7:30 AM Carlos Harris PT [facility]

## 2022-08-26 ENCOUNTER — HOSPITAL ENCOUNTER (OUTPATIENT)
Dept: PHYSICAL THERAPY | Age: 62
Discharge: HOME | End: 2022-08-26
Payer: COMMERCIAL

## 2022-08-26 PROCEDURE — 97112 NEUROMUSCULAR REEDUCATION: CPT

## 2022-08-26 PROCEDURE — 97530 THERAPEUTIC ACTIVITIES: CPT

## 2022-08-26 NOTE — PROGRESS NOTES
PT DAILY TREATMENT NOTE - [facility]     Patient Name: Zelda Bernardo  Date:2022  : 1960  [x]  Patient  Verified  Payor: Nica Cerrato / Plan: Tiera Flores HMO / Product Type: HMO /    In time:347  Out time:425  Total Treatment Time (min): 38  Visit #: 3 of 8    Treatment Area: Other abnormalities of gait and mobility [R26.89]    SUBJECTIVE  Pain Level (0-10 scale): 0/10  Any medication changes, allergies to medications, adverse drug reactions, diagnosis change, or new procedure performed?: [x] No    [] Yes (see summary sheet for update)  Subjective functional status/changes:   [] No changes reported  Reports she still takes Meclazine but has not for 2 days. It helps but makes her sleepy/groggy. OBJECTIVE  23 min Therapeutic Activity:  []  See flow sheet :   Rationale: increase ROM, increase strength, improve coordination, and improve balance  to improve the patients ability to ease with adl's     15 min Neuromuscular Re-education:  []  See flow sheet :   Rationale: increase ROM, increase strength, and improve coordination  to improve the patients ability to ease with functional ADL's  Rationale: With   [] TE   [] TA   [] neuro   [] other: Patient Education: [x] Review HEP    [] Progressed/Changed HEP based on:   [] positioning   [] body mechanics   [] transfers   [] heat/ice application    [] other:      Other Objective/Functional Measures: Hallpike Kiara right /positive. Long duration rotational mild nystagmus. Epley x 2. Post Epley instructions. Pain Level (0-10 scale) post treatment: 0/10    ASSESSMENT/Changes in Function: Progressing slowly. Pt tolerated Epley Maneuver and was issued post epley instructions x 2 days. Pt will return on Monday for follow up. She has not taken Meclizine x 2 days and may hold Meclizine.      Patient will continue to benefit from skilled PT services to modify and progress therapeutic interventions, address functional mobility deficits, address ROM deficits, address strength deficits, analyze and address soft tissue restrictions, analyze and cue movement patterns, analyze and modify body mechanics/ergonomics, assess and modify postural abnormalities, address imbalance/dizziness, and instruct in home and community integration to attain remaining goals. []  See Plan of Care  [x]  See progress note/recertification  []  See Discharge Summary         Progress towards goals / Updated goals:  Short Term Goals: To be accomplished in 1 weeks:  Patient will demonstrate compliance with HEP in order to increase ease of ambulation. Met      Long Term Goals: To be accomplished in 6 weeks:  Goal: Patient will improve FOTO score by 10 points in order to demonstrate a significant improvement in function. Status at evaluation/last progress note: 52     2. Goal: Patient will improve FGA score by 4 points in order to demonstrate a significant improvement in balance during ambulation. Status at evaluation/last progress note: 18/30     3. Goal: Patient will improve 30 second sit to stand test to 8x in order to increase ease of transfers at home. Status at evaluation/last progress note: 6x     4. Goal: Patient will improve SOT score to WNL for her age group in order to improve quality of life.    Status at evaluation/last progress note: n/a    PLAN  []  Upgrade activities as tolerated     []  Continue plan of care  []  Update interventions per flow sheet       []  Discharge due to:_  []  Other:_      Jefry Vieira, PT 8/26/2022  3:03 PM    Future Appointments   Date Time Provider Ronda Joseph   8/26/2022  3:45 PM Mami Christopher, PT [facility]   8/29/2022 11:15 AM Mami Christopher, PT [facility]   9/2/2022  4:30 PM Mami Christopher PT [facility]   9/6/2022  8:15 AM Birgit Frances, PTA [facility]   9/9/2022  7:30 AM Ashlee Thomas, PT [facility]   9/13/2022  4:30 PM Mami Christopher PT [facility]   9/16/2022  4:30 PM Mami Christopher, PT [facility]   9/20/2022  6:00 PM Dona Metzger, [place] Coley [id number]   9/23/2022  7:30 AM Jose Angel Nowak, PT [facility]

## 2022-08-29 ENCOUNTER — HOSPITAL ENCOUNTER (OUTPATIENT)
Dept: PHYSICAL THERAPY | Age: 62
Discharge: HOME | End: 2022-08-29
Payer: COMMERCIAL

## 2022-08-29 PROCEDURE — 97112 NEUROMUSCULAR REEDUCATION: CPT

## 2022-08-29 NOTE — PROGRESS NOTES
PT DAILY TREATMENT NOTE - [facility]     Patient Name: Adonis Cadet  Date:2022  : 1960  [x]  Patient  Verified  Payor: Doug Berger / Plan: [employer] / Product Type: HMO /    In time:1116  Out time:1155  Total Treatment Time (min): 39  Visit #: 4 of 8    Treatment Area: Other abnormalities of gait and mobility [R26.89]    SUBJECTIVE  Pain Level (0-10 scale): 0/10  Any medication changes, allergies to medications, adverse drug reactions, diagnosis change, or new procedure performed?: [x] No    [] Yes (see summary sheet for update)  Subjective functional status/changes:   [] No changes reported  Reports the dizziness in better, but did no sleep well. She has pinched nerves in her neck and back. It's bothering her. OBJECTIVE     39 min Neuromuscular Re-education:  []  See flow sheet :   Rationale: increase ROM, increase strength, improve coordination, and improve balance  to improve the patients ability to ease with adl's          With   [] TE   [] TA   [] neuro   [] other: Patient Education: [x] Review HEP    [] Progressed/Changed HEP based on:   [] positioning   [] body mechanics   [] transfers   [] heat/ice application    [] other:      Other Objective/Functional Measures: Hallpike osbaldo=negative bilaterally. Ambulate with Vertical /Horizontal head Turns with deviations and crossover patterns.   -Pt to use eyes only w/o Head turns, vertical and horizontal.   -no reports of dizziness during today's session. Pain Level (0-10 scale) post treatment: 0/10    ASSESSMENT/Changes in Function: Progressing well. Pt reports feeling relief to know her sx are \"normal\" for feeling disequilibrium. Pt reminded that stress levels can potentially increase sx onset.    HEP: amb with eyes only/VOR CXL x 30 sec    Patient will continue to benefit from skilled PT services to modify and progress therapeutic interventions, address functional mobility deficits, address ROM deficits, address strength deficits, analyze and address soft tissue restrictions, analyze and cue movement patterns, analyze and modify body mechanics/ergonomics, assess and modify postural abnormalities, and address imbalance/dizziness to attain remaining goals. []  See Plan of Care  [x]  See progress note/recertification  []  See Discharge Summary         Progress towards goals / Updated goals:  Short Term Goals: To be accomplished in 1 weeks:  Patient will demonstrate compliance with HEP in order to increase ease of ambulation. Met      Long Term Goals: To be accomplished in 6 weeks:  Goal: Patient will improve FOTO score by 10 points in order to demonstrate a significant improvement in function. Status at evaluation/last progress note: 52     2. Goal: Patient will improve FGA score by 4 points in order to demonstrate a significant improvement in balance during ambulation. Status at evaluation/last progress note: 18/30     3. Goal: Patient will improve 30 second sit to stand test to 8x in order to increase ease of transfers at home. Status at evaluation/last progress note: 6x     4. Goal: Patient will improve SOT score to WNL for her age group in order to improve quality of life.    Status at evaluation/last progress note: n/a    PLAN  [x]  Upgrade activities as tolerated     [x]  Continue plan of care  []  Update interventions per flow sheet       []  Discharge due to:_  []  Other:_      Bharat Coleman, PT 8/29/2022  10:39 AM    Future Appointments   Date Time Provider Ronda Joseph   8/29/2022 11:15 AM Mirtha Morton, PT [facility]   9/2/2022  4:30 PM Mirtha Morton, PT [facility]   9/6/2022  8:15 AM Karole June, PTA [facility]   9/9/2022  7:30 AM Media Shanda, PT [facility]   9/13/2022  4:30 PM Mirtha Morton, PT [facility]   9/16/2022  4:30 PM Mirtha Morton, PT [facility]   9/20/2022  6:00 PM Mirtha Morton, PT [facility]   9/23/2022  7:30 AM Media Shanda, PT [facility]

## 2022-09-02 ENCOUNTER — APPOINTMENT (OUTPATIENT)
Dept: PHYSICAL THERAPY | Age: 62
End: 2022-09-02
Payer: COMMERCIAL

## 2022-09-09 ENCOUNTER — HOSPITAL ENCOUNTER (OUTPATIENT)
Dept: PHYSICAL THERAPY | Age: 62
Discharge: HOME | End: 2022-09-09
Payer: COMMERCIAL

## 2022-09-09 PROCEDURE — 97112 NEUROMUSCULAR REEDUCATION: CPT

## 2022-09-09 NOTE — PROGRESS NOTES
PT DAILY TREATMENT NOTE     Patient Name: Ashley Cole  Date:2022  : 1960  [x]  Patient  Verified  Payor: Nabil Field / Plan: Iraida Syed HMO / Product Type: HMO /    In time: 7:31  Out time: 8:11  Total Treatment Time (min): 40  Visit #: 4 of 8    Treatment Area: Other abnormalities of gait and mobility [R26.89]    SUBJECTIVE  Pain Level (0-10 scale): 0/10  Any medication changes, allergies to medications, adverse drug reactions, diagnosis change, or new procedure performed?: [x] No    [] Yes (see summary sheet for update)  Subjective functional status/changes:   [] No changes reported  Pt. Reports no dizziness today but still happens occasionally. She hasn't taken Meclizine for a couple of weeks. OBJECTIVE     40 min Neuromuscular Re-education:  []  See flow sheet : standing balance activities, ambulation with head turns, ambulation with VOR, tandem walking   Rationale: increase strength, improve coordination, and improve balance  to improve the patients ability to ambulate     With   [x] TE   [] TA   [] neuro   [] other: Patient Education: [x] Review HEP    [] Progressed/Changed HEP based on:   [] positioning   [] body mechanics   [] transfers   [] heat/ice application    [] other:      Other Objective/Functional Measures:   30 second sit to stand test: 7x  Dizziness with prolonged bent over activities. She was educated on incorporating this for HEP  Improved balance with tandem walking and ambulation with head turns  Pt. Is agreeable to D/C next visit     Pain Level (0-10 scale) post treatment: 0/10    ASSESSMENT/Changes in Function: pt. Is progressing well with physical therapy. She demonstrates improving balance during PT and is having less dizziness overall. She continues to have mild symptoms with bending over activities, especially if holding that position.      Patient will continue to benefit from skilled PT services to modify and progress therapeutic interventions, address functional mobility deficits, address ROM deficits, address strength deficits, analyze and address soft tissue restrictions, analyze and cue movement patterns, analyze and modify body mechanics/ergonomics, assess and modify postural abnormalities, and address imbalance/dizziness to attain remaining goals. Progress towards goals / Updated goals:  Short Term Goals: To be accomplished in 1 weeks:  Patient will demonstrate compliance with HEP in order to increase ease of ambulation. Met      Long Term Goals: To be accomplished in 6 weeks:  Goal: Patient will improve FOTO score by 10 points in order to demonstrate a significant improvement in function. Status at evaluation/last progress note: 52     2. Goal: Patient will improve FGA score by 4 points in order to demonstrate a significant improvement in balance during ambulation. Status at evaluation/last progress note: 18/30     3. Goal: Patient will improve 30 second sit to stand test to 8x in order to increase ease of transfers at home. Status at evaluation/last progress note: 6x  Not met: 7x (9/9/22)     4. Goal: Patient will improve SOT score to WNL for her age group in order to improve quality of life.    Status at evaluation/last progress note: n/a    PLAN  []  Upgrade activities as tolerated     [x]  Continue plan of care  []  Update interventions per flow sheet       []  Discharge due to:_  []  Other:_      Juventino Bush PT 9/9/2022  7:00 AM    Future Appointments   Date Time Provider Ronda Joseph   9/9/2022  7:30 AM Joana Dandy, PT [facility]   9/13/2022  4:30 PM Adriana Hayward PT [facility]   9/16/2022  3:00 PM Adriana Hayward PT [facility]   9/20/2022  6:00 PM Adriana Hayward PT [facility]   9/23/2022  7:30 AM Joana Dandy, PT [facility]

## 2022-09-13 ENCOUNTER — HOSPITAL ENCOUNTER (OUTPATIENT)
Dept: PHYSICAL THERAPY | Age: 62
Discharge: HOME | End: 2022-09-13
Payer: COMMERCIAL

## 2022-09-13 PROCEDURE — 97530 THERAPEUTIC ACTIVITIES: CPT

## 2022-09-13 PROCEDURE — 97112 NEUROMUSCULAR REEDUCATION: CPT

## 2022-09-13 NOTE — PROGRESS NOTES
PT DAILY TREATMENT NOTE - [facility]     Patient Name: Keren Feliciano  DGVT:  : 1960  [x]  Patient  Verified  Payor: Saint Bran / Plan: [employer] / Product Type: HMO /    In time:430  Out time:509  Total Treatment Time (min): 39  Visit #: 6 of 10    Treatment Area: Other abnormalities of gait and mobility [R26.89]    SUBJECTIVE  Pain Level (0-10 scale): 0/10  Any medication changes, allergies to medications, adverse drug reactions, diagnosis change, or new procedure performed?: [x] No    [] Yes (see summary sheet for update)  Subjective functional status/changes:   [] No changes reported  98.9 % improved. Reports she just needs to slow down. OBJECTIVE    23 min Therapeutic Activity:  []  See flow sheet :   Rationale: increase ROM, increase strength, and improve coordination  to improve the patients ability to ease with adl's     15 min Neuromuscular Re-education:  []  See flow sheet :   Rationale: increase ROM, increase strength, improve coordination, and improve balance  to improve the patients ability to ease with ADL's    With   [] TE   [] TA   [] neuro   [] other: Patient Education: [x] Review HEP    [] Progressed/Changed HEP based on:   [] positioning   [] body mechanics   [] transfers   [] heat/ice application    [] other:      Other Objective/Functional Measures: FOTO=62   FGA=29/30  30 sec sit to stand x 12  Pain Level (0-10 scale) post treatment: 0/10    ASSESSMENT/Changes in Function: All goals met    Patient will continue to benefit from skilled PT services to  DC with goals met  to attain remaining goals. []  See Plan of Care  []  See progress note/recertification  [x]  See Discharge Summary         Progress towards goals / Updated goals:  Short Term Goals: To be accomplished in 1 weeks:  Patient will demonstrate compliance with HEP in order to increase ease of ambulation. Met      Long Term Goals:  To be accomplished in 6 weeks:  Goal: Patient will improve FOTO score by 10 points in order to demonstrate a significant improvement in function. Status at evaluation/last progress note: 52     2. Goal: Patient will improve FGA score by 4 points in order to demonstrate a significant improvement in balance during ambulation. Status at evaluation/last progress note: 18/30     3. Goal: Patient will improve 30 second sit to stand test to 8x in order to increase ease of transfers at home. Status at evaluation/last progress note: 6x  Not met: 7x (9/9/22)  30 sit to stand =12     PLAN  []  Upgrade activities as tolerated     []  Continue plan of care  []  Update interventions per flow sheet       [x]  Discharge due to:_goals met.    []  Other:_      Rachel Casas PT 9/13/2022  4:49 PM    Future Appointments   Date Time Provider Ronda Joseph   9/16/2022  3:00 PM Jaclyn Bishop, PT [facility]   9/20/2022  6:00 PM Jaclyn Bishop PT [facility]   9/23/2022  7:30 AM Ledy Vigil PT [facility]

## 2022-09-16 ENCOUNTER — HOSPITAL ENCOUNTER (OUTPATIENT)
Dept: PHYSICAL THERAPY | Age: 62
End: 2022-09-16
Payer: COMMERCIAL

## 2022-09-20 ENCOUNTER — APPOINTMENT (OUTPATIENT)
Dept: PHYSICAL THERAPY | Age: 62
End: 2022-09-20
Payer: COMMERCIAL

## 2022-09-23 ENCOUNTER — APPOINTMENT (OUTPATIENT)
Dept: PHYSICAL THERAPY | Age: 62
End: 2022-09-23
Payer: COMMERCIAL

## 2022-09-23 NOTE — ANCILLARY DISCHARGE INSTRUCTIONS
[facility]  [address]  [phone] fax    Physical Therapy Discharge Summary     Patient name: Shashank Bain Start of Care: 2022   Referral source: Ross Pedroza NP : 1960                Medical Diagnosis: BPV (benign positional vertigo), left [H81.12]  Payor: Veronika Harry / Plan: [employer] HMO / Product Type: HMO /  Onset Date: 2022                Treatment Diagnosis: abnormalities of gait and mobility    Prior Hospitalization: see medical history Provider#: [id number]   Medications: Verified on Patient summary List    Comorbidities: diabetes, HTN, depression, arthritis   Prior Level of Function: Ind with ADLs, Ind with ambulation, working          Visits from Start of Care: 6    Missed Visits: 2    Reporting Period : 22 to 22    Summary of Care:  Goal: Patient will improve FOTO score by 10 points in order to demonstrate a significant improvement in function. Status at evaluation/last progress note: 52  Current: FOTO=62     2. Goal: Patient will improve FGA score by 4 points in order to demonstrate a significant improvement in balance during ambulation. Status at evaluation/last progress note:   Current: FGA:  MET     3.   Goal: Patient will improve 30 second sit to stand test to 8x in order to increase ease of transfers at home. Status at evaluation/last progress note: 6x   Current: met. 12 x sit to stand    4. Goal: Patient will improve SOT score to WNL for her age group in order to improve quality of life. Status at evaluation/last progress note: n/a     Pt report 98.9% improvement . She has return to all functional ADL's without sx. She reports \"she needs to slow down\"   Pt DC'd with goals met.       ASSESSMENT/RECOMMENDATIONS:  [x]Discontinue therapy: [x]Patient has reached or is progressing toward set goals      []Patient is non-compliant or has abdicated      []Due to lack of appreciable progress towards set goals    Deven Boards, PT 9/23/2022 2:22 PM

## 2023-01-30 DIAGNOSIS — Z12.31 SCREENING MAMMOGRAM, ENCOUNTER FOR: Primary | ICD-10-CM

## 2023-02-03 DIAGNOSIS — Z12.31 SCREENING MAMMOGRAM, ENCOUNTER FOR: Primary | ICD-10-CM

## 2025-03-19 ENCOUNTER — TRANSCRIBE ORDERS (OUTPATIENT)
Facility: HOSPITAL | Age: 65
End: 2025-03-19

## 2025-03-19 DIAGNOSIS — M79.605 LEFT LEG PAIN: Primary | ICD-10-CM

## 2025-03-21 ENCOUNTER — HOSPITAL ENCOUNTER (OUTPATIENT)
Facility: HOSPITAL | Age: 65
Discharge: HOME | End: 2025-03-23
Payer: COMMERCIAL

## 2025-03-21 DIAGNOSIS — M79.605 LEFT LEG PAIN: ICD-10-CM

## 2025-03-21 PROCEDURE — 93971 EXTREMITY STUDY: CPT | Performed by: INTERNAL MEDICINE

## 2025-03-21 PROCEDURE — 93971 EXTREMITY STUDY: CPT
